# Patient Record
Sex: MALE | Race: BLACK OR AFRICAN AMERICAN | NOT HISPANIC OR LATINO | Employment: OTHER | URBAN - METROPOLITAN AREA
[De-identification: names, ages, dates, MRNs, and addresses within clinical notes are randomized per-mention and may not be internally consistent; named-entity substitution may affect disease eponyms.]

---

## 2017-07-13 PROBLEM — D64.9 SYMPTOMATIC ANEMIA: Status: ACTIVE | Noted: 2017-07-13

## 2017-07-14 PROBLEM — K21.9 GERD (GASTROESOPHAGEAL REFLUX DISEASE): Status: ACTIVE | Noted: 2017-07-14

## 2017-07-14 PROBLEM — I25.10 CAD (CORONARY ARTERY DISEASE): Status: ACTIVE | Noted: 2017-07-14

## 2017-07-14 PROBLEM — E78.5 HLD (HYPERLIPIDEMIA): Status: ACTIVE | Noted: 2017-07-14

## 2018-09-29 ENCOUNTER — HOSPITAL ENCOUNTER (EMERGENCY)
Facility: HOSPITAL | Age: 73
Discharge: HOME | End: 2018-09-29
Attending: EMERGENCY MEDICINE
Payer: MEDICARE

## 2018-09-29 VITALS
BODY MASS INDEX: 22.53 KG/M2 | OXYGEN SATURATION: 95 % | HEIGHT: 73 IN | WEIGHT: 170 LBS | SYSTOLIC BLOOD PRESSURE: 127 MMHG | TEMPERATURE: 99 F | DIASTOLIC BLOOD PRESSURE: 67 MMHG | HEART RATE: 72 BPM | RESPIRATION RATE: 16 BRPM

## 2018-09-29 DIAGNOSIS — R79.1 SUPRATHERAPEUTIC INR: Primary | ICD-10-CM

## 2018-09-29 LAB
ALBUMIN SERPL BCP-MCNC: 3.7 G/DL
ALP SERPL-CCNC: 49 U/L
ALT SERPL W/O P-5'-P-CCNC: 14 U/L
ANION GAP SERPL CALC-SCNC: 10 MMOL/L
APTT BLDCRRT: 38.3 SEC
AST SERPL-CCNC: 19 U/L
BASOPHILS # BLD AUTO: 0.02 K/UL
BASOPHILS NFR BLD: 0.5 %
BILIRUB SERPL-MCNC: 0.2 MG/DL
BUN SERPL-MCNC: 19 MG/DL
CALCIUM SERPL-MCNC: 9.4 MG/DL
CHLORIDE SERPL-SCNC: 107 MMOL/L
CO2 SERPL-SCNC: 23 MMOL/L
CREAT SERPL-MCNC: 1.2 MG/DL
DIFFERENTIAL METHOD: ABNORMAL
EOSINOPHIL # BLD AUTO: 0.2 K/UL
EOSINOPHIL NFR BLD: 4.8 %
ERYTHROCYTE [DISTWIDTH] IN BLOOD BY AUTOMATED COUNT: 14.8 %
EST. GFR  (AFRICAN AMERICAN): >60 ML/MIN/1.73 M^2
EST. GFR  (NON AFRICAN AMERICAN): 59.6 ML/MIN/1.73 M^2
GLUCOSE SERPL-MCNC: 102 MG/DL
HCT VFR BLD AUTO: 39.9 %
HGB BLD-MCNC: 12.3 G/DL
IMM GRANULOCYTES # BLD AUTO: 0.01 K/UL
IMM GRANULOCYTES NFR BLD AUTO: 0.3 %
INR PPP: 4.3
LYMPHOCYTES # BLD AUTO: 0.7 K/UL
LYMPHOCYTES NFR BLD: 18.8 %
MCH RBC QN AUTO: 29.1 PG
MCHC RBC AUTO-ENTMCNC: 30.8 G/DL
MCV RBC AUTO: 95 FL
MONOCYTES # BLD AUTO: 0.3 K/UL
MONOCYTES NFR BLD: 6.4 %
NEUTROPHILS # BLD AUTO: 2.7 K/UL
NEUTROPHILS NFR BLD: 69.2 %
NRBC BLD-RTO: 0 /100 WBC
PLATELET # BLD AUTO: 261 K/UL
PMV BLD AUTO: 10.2 FL
POTASSIUM SERPL-SCNC: 3.7 MMOL/L
PROT SERPL-MCNC: 7.6 G/DL
PROTHROMBIN TIME: 42 SEC
RBC # BLD AUTO: 4.22 M/UL
SODIUM SERPL-SCNC: 140 MMOL/L
WBC # BLD AUTO: 3.93 K/UL

## 2018-09-29 PROCEDURE — 85025 COMPLETE CBC W/AUTO DIFF WBC: CPT

## 2018-09-29 PROCEDURE — 85730 THROMBOPLASTIN TIME PARTIAL: CPT

## 2018-09-29 PROCEDURE — 99283 EMERGENCY DEPT VISIT LOW MDM: CPT

## 2018-09-29 PROCEDURE — 99283 EMERGENCY DEPT VISIT LOW MDM: CPT | Mod: ,,, | Performed by: PHYSICIAN ASSISTANT

## 2018-09-29 PROCEDURE — 80053 COMPREHEN METABOLIC PANEL: CPT

## 2018-09-29 PROCEDURE — 85610 PROTHROMBIN TIME: CPT

## 2018-09-29 NOTE — ED TRIAGE NOTES
States that his doctor called him Friday and told him he has a blockage to his jugular vein (side unknown).  He presents to ER to find out what he is supposed to do.  States that he is having pain in his right neck and down his right arm.  Patient's name and date of birth checked and is correct.    LOC: The patient is awake, alert, and oriented to place, time, situation. Affect is appropriate.  Speech is appropriate and clear.      APPEARANCE: Patient resting comfortably, reporting palpation, light headedness,  in no acute distress.  Patient is clean and well groomed.     SKIN: The skin is warm and dry; color consistent with ethnicity.  Patient has normal skin turgor and moist mucus membranes.  Skin intact; no breakdown or bruising noted.      MUSCULOSKELETAL: Patient moving upper extremities without difficulty.  Has weakness in his lower extremities and an unsteady gait.      RESPIRATORY: Airway is open and patent. Respirations spontaneous, even, easy, and non-labored.  Patient has a normal effort and rate.  No accessory muscle use noted. Denies cough.  BS clear.     CARDIAC:  No peripheral edema noted. No complaints of chest pain.       ABDOMEN: Soft and non tender to palpation.  No distention noted.      NEUROLOGIC: Eyes open spontaneously.  Behavior appropriate to situation.  Follows commands; facial expression symmetrical.  Purposeful motor response noted; normal sensation in all extremities.

## 2018-09-29 NOTE — ED PROVIDER NOTES
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 Patient states that he had an appointment with his heart doctor in [place] yesterday and ultrasounds were done at the time.  He was told that he has a clot into his vein.  He is not sure what side.  He states that he was told that records will be sent to his family doctor for follow-up but nothing was sent.  He states that he was not sure what the treatment plan was therefore he came to the ED.  He states that he has had some right neck discomfort for many months now.  No associated swelling or redness to the site.  He denies associated shortness of breath or chest pain. Patient states he has been compliant with his Coumadin.  Denies any injury trauma or fall.      The history is provided by the patient.     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     Past Surgical History:   Procedure Laterality Date    BACK SURGERY  1982     CHOLECYSTECTOMY      ERCP N/A 9/9/2014    [facility]formed by [facility] (2ND FLR)    ESOPHAGOGASTRODUODENOSCOPY (EGD) N/A 8/19/2015    [facility] by [facility] END[facility] (2ND FLR)    LUNG LOBECTOMY Left 2001    Left lower lobe    ULTRASOUND-ENDOSCOPIC-UPPER N/A 2/24/2015    [facility] by [facility] END[facility] (2ND FLR)    ULTRASOUND-ENDOSCOPIC-UPPER N/A 8/6/2014    Performed by [facility] at [facility] ENDO (2ND FLR)     Family History   Problem Relation Age of Onset    Hypertension Father     Diabetes Sister      Social History     Tobacco Use    Smoking status: Never Smoker    Smokeless tobacco: Never Used   Substance Use Topics    Alcohol use: No    Drug use: No     Review of Systems   Constitutional: Negative for chills and fever.   HENT: Negative for congestion.    Respiratory: Negative for cough and shortness of breath.    Cardiovascular: Negative for chest pain and palpitations.   Gastrointestinal: Negative for nausea and vomiting.   Musculoskeletal: Positive for neck pain.   Skin: Negative for rash and wound.   Allergic/Immunologic: Negative for immunocompromised state.   Neurological: Negative for dizziness and weakness.   Hematological: Does not bruise/bleed easily.   Psychiatric/Behavioral: Negative for confusion.       Physical Exam     Initial Vitals [09/29/18 1553]   BP Pulse Resp Temp SpO2   122/67 88 18 98.7 °F (37.1 °C) 99 %      MAP       --         Physical Exam    Constitutional: He appears well-developed and well-nourished. He is not diaphoretic. No distress.   HENT:   Head: Normocephalic and atraumatic.   Mouth/Throat: Oropharynx is clear and moist.   Eyes: Conjunctivae and EOM are normal.   Neck: Normal range of motion and full passive range of motion without pain. Neck supple. No spinous process tenderness and no muscular tenderness present. No edema and no erythema present.   Cardiovascular: Normal rate, regular rhythm, normal heart sounds  and intact distal pulses.   Pulmonary/Chest: Breath sounds normal.   Neurological: He is alert and oriented to person, place, and time.   Skin: Skin is warm and dry.         ED Course   Procedures  Labs Reviewed   CBC W/ AUTO DIFFERENTIAL - Abnormal; Notable for the following components:       Result Value    RBC 4.22 (*)     Hemoglobin 12.3 (*)     Hematocrit 39.9 (*)     MCHC 30.8 (*)     RDW 14.8 (*)     Lymph # 0.7 (*)     All other components within normal limits   COMPREHENSIVE METABOLIC PANEL - Abnormal; Notable for the following components:    Alkaline Phosphatase 49 (*)     eGFR if non  59.6 (*)     All other components within normal limits   PROTIME-INR - Abnormal; Notable for the following components:    Prothrombin Time 42.0 (*)     INR 4.3 (*)     All other components within normal limits   APTT - Abnormal; Notable for the following components:    aPTT 38.3 (*)     All other components within normal limits          Imaging Results    None                APC / Resident Notes:   Patient was seen in the ER promptly upon arrival.  He is afebrile, no acute distress.  Lung auscultation clear.  No swelling to the upper extremity bilaterally.     Laboratory studies show normal white count of 3.9.  Hemoglobin stable.  Chemistries were unremarkable. Patient's INR is supratherapeutic at 4.3.     Do not feel that he requires another ultrasound at this time as he is super therapeutic in his INR.  I did advise both patient and daughter to obtain imaging report from the cardiovascular [place] that conducted the ultrasound.  Advised to follow up with Heme-Onc as well as family doctor on Monday regarding further management of this possible clot.  He was also advised to follow up with his family doctor regarding management of his Coumadin level.    Vitals were stable on discharge. He was given strict return precautions to the ED.  Was agreeable to this treatment plan. Stable for discharge. The care of  this patient was overseen by attending physician who agrees with treatment, plan, and disposition.           Attending Attestation:     Physician Attestation Statement for NP/PA:   I have conducted a face to face encounter with this patient in addition to the NP/PA, due to Medical Complexity    Other NP/PA Attestation Additions:      Medical Decision Making: Had previous subclav dvt that needed stenting after having a pic line. Sounds like  A partial dvt of jugular now. Will check inr and blood counts. No acute indix to repeat image based on absence of physical findings.                     Clinical Impression:   The encounter diagnosis was Supratherapeutic INR.      Disposition:   Disposition: Discharged  Condition: Stable                        Ebony Serrato PA-C  09/29/18 2555

## 2018-09-29 NOTE — DISCHARGE INSTRUCTIONS
Follow-up with your family doctor or hematologist on Monday morning.  Obtain records of ultrasound from [place].  Your current INR is 4.3, follow up with your doctor regarding this

## 2018-09-29 NOTE — ED NOTES
Patient is a very poor historian in regards to his medication, therefore, an accurate medication review is not possible at this time.

## 2018-10-11 ENCOUNTER — TELEPHONE (OUTPATIENT)
Dept: ADMINISTRATIVE | Facility: OTHER | Age: 73
End: 2018-10-11

## 2018-10-11 NOTE — TELEPHONE ENCOUNTER
----- Message from Kesha Whelan sent at 10/11/2018  5:21 PM CDT -----  Contact: Pt yahir Villegas can be reached at [phone]  Pt has blood clot located on right side of his neck.  Pt is requesting an appt soon as possible.      Thank you!

## 2018-10-12 NOTE — TELEPHONE ENCOUNTER
Left message for the pt to call the clinic, no ans on the [phone] . Need more information to schedule the pt

## 2018-10-15 PROBLEM — D64.9 ANEMIA: Status: ACTIVE | Noted: 2018-10-15

## 2018-10-17 PROBLEM — R79.1 SUPRATHERAPEUTIC INR: Status: ACTIVE | Noted: 2018-10-17

## 2018-10-17 PROBLEM — I95.9 HYPOTENSION: Status: ACTIVE | Noted: 2018-10-17

## 2018-11-14 ENCOUNTER — OFFICE VISIT (OUTPATIENT)
Dept: GASTROENTEROLOGY | Facility: CLINIC | Age: 73
End: 2018-11-14
Payer: MEDICARE

## 2018-11-14 ENCOUNTER — TELEPHONE (OUTPATIENT)
Dept: GASTROENTEROLOGY | Facility: CLINIC | Age: 73
End: 2018-11-14

## 2018-11-14 ENCOUNTER — OFFICE VISIT (OUTPATIENT)
Dept: SURGERY | Facility: CLINIC | Age: 73
End: 2018-11-14
Payer: MEDICARE

## 2018-11-14 VITALS
DIASTOLIC BLOOD PRESSURE: 78 MMHG | BODY MASS INDEX: 23.99 KG/M2 | HEART RATE: 78 BPM | WEIGHT: 181 LBS | HEIGHT: 73 IN | SYSTOLIC BLOOD PRESSURE: 147 MMHG

## 2018-11-14 VITALS
WEIGHT: 180.81 LBS | SYSTOLIC BLOOD PRESSURE: 140 MMHG | HEART RATE: 76 BPM | HEIGHT: 73 IN | DIASTOLIC BLOOD PRESSURE: 75 MMHG | BODY MASS INDEX: 23.96 KG/M2

## 2018-11-14 DIAGNOSIS — K92.2 GASTROINTESTINAL HEMORRHAGE, UNSPECIFIED GASTROINTESTINAL HEMORRHAGE TYPE: Primary | ICD-10-CM

## 2018-11-14 DIAGNOSIS — D50.9 IRON DEFICIENCY ANEMIA, UNSPECIFIED IRON DEFICIENCY ANEMIA TYPE: ICD-10-CM

## 2018-11-14 DIAGNOSIS — K29.70 GASTRITIS, PRESENCE OF BLEEDING UNSPECIFIED, UNSPECIFIED CHRONICITY, UNSPECIFIED GASTRITIS TYPE: Primary | ICD-10-CM

## 2018-11-14 PROCEDURE — 99213 OFFICE O/P EST LOW 20 MIN: CPT | Mod: PBBFAC,27 | Performed by: INTERNAL MEDICINE

## 2018-11-14 PROCEDURE — 99999 PR PBB SHADOW E&M-EST. PATIENT-LVL III: CPT | Mod: PBBFAC,,, | Performed by: INTERNAL MEDICINE

## 2018-11-14 PROCEDURE — 99499 UNLISTED E&M SERVICE: CPT | Mod: S$PBB,,, | Performed by: COLON & RECTAL SURGERY

## 2018-11-14 PROCEDURE — 99999 PR PBB SHADOW E&M-EST. PATIENT-LVL III: CPT | Mod: PBBFAC,,, | Performed by: COLON & RECTAL SURGERY

## 2018-11-14 PROCEDURE — 99213 OFFICE O/P EST LOW 20 MIN: CPT | Mod: PBBFAC | Performed by: COLON & RECTAL SURGERY

## 2018-11-14 PROCEDURE — 99204 OFFICE O/P NEW MOD 45 MIN: CPT | Mod: S$PBB,,, | Performed by: INTERNAL MEDICINE

## 2018-11-14 NOTE — H&P (VIEW-ONLY)
REASON FOR VISIT:  Melena.    HISTORY OF PRESENT ILLNESS:  Mr. Lima was previously seen in [facility] for chronic anemia secondary to suspected obscure GI bleed.  He   is on Coumadin for DVT and had melena with progressive anemia.  He recently got   transfused 2 units of packed red blood cells and had to be re-transfused   another 2 units.  Recent EGD and colonoscopy were fine and a video capsule   endoscopy that was done by Dr. 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  He continues to complain of black   stools, although he is not on oral iron supplementation.  Denies any abdominal   pain, nausea or vomiting.  No hematemesis, maroon stool or bright red blood per   rectum.  He takes a baby aspirin every day and he is also on Coumadin.    PAST MEDICAL, SURGICAL, SOCIAL AND FAMILY HISTORY:  Reviewed.    MEDICATIONS AND ALLERGIES:  Reviewed.    REVIEW OF SYSTEMS:  CONSTITUTIONAL:  No fever, no chills, no weight loss.  Appetite is normal.  EYES:  No visual change.  ENT:  No odynophagia or hoarseness of voice.  CARDIOVASCULAR:  No angina or palpitations.  RESPIRATORY:  No shortness of breath or wheezing.  GASTROINTESTINAL:  See HPI.    PHYSICAL EXAMINATION:  VITAL SIGNS:  See EPIC.  GENERAL:  Awake, alert and oriented x3, in no acute distress.  NECK:  Supple, no carotid bruit.  No cervical adenopathy.  ABDOMEN:  Flat, soft, nontender, nondistended.  No masses palpable.  No   hepatosplenomegaly appreciated.  Bowel sounds are normal.  EYES:  Conjunctivae anicteric.  ENT:  Oropharynx, mucosa moist.  Mallampati 1.  CARDIOVASCULAR:  S1, S2 normal.  RESPIRATORY:  Bilateral air entry equal.  SKIN:  No palmar erythema or spider angiomata.  NEUROLOGIC:  No asterixis.  PSYCHIATRIC:  Affect appropriate.  LOWER EXTREMITY:  No pedal edema.    IMPRESSION:  Obscure GI bleed, on anticoagulation with recent melena.  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    RECOMMENDATION:  1.  Proceed with anterograde balloon-assisted enteroscopy.  2.  Check CBC today.  Check occult blood in the stool.  Further recommendation   based on above.      ACT/HN  dd: 11/14/2018 14:18:10 (CST)  td: 11/15/2018 06:04:34 (CST)  Doc ID   #[id number]  Job ID #[id number]    CC:

## 2018-11-14 NOTE — TELEPHONE ENCOUNTER
----- Message from Thang Gardner MD sent at 11/14/2018  3:00 PM CST -----  Please notify patient, the blood count is stable.

## 2018-11-14 NOTE — TELEPHONE ENCOUNTER
Dr. Jj Metz seen this patient today and he states he need a ballon   Please advise pt on schedule  thanks

## 2018-11-14 NOTE — PROGRESS NOTES
REASON FOR VISIT:  Melena.    HISTORY OF PRESENT ILLNESS:  Mr. Lima was previously seen in [facility] for chronic anemia secondary to suspected obscure GI bleed.  He   is on Coumadin for DVT and had melena with progressive anemia.  He recently got   transfused 2 units of packed red blood cells and had to be re-transfused   another 2 units.  Recent EGD and colonoscopy were fine and a video capsule   endoscopy that was done by Dr. 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  He continues to complain of black   stools, although he is not on oral iron supplementation.  Denies any abdominal   pain, nausea or vomiting.  No hematemesis, maroon stool or bright red blood per   rectum.  He takes a baby aspirin every day and he is also on Coumadin.    PAST MEDICAL, SURGICAL, SOCIAL AND FAMILY HISTORY:  Reviewed.    MEDICATIONS AND ALLERGIES:  Reviewed.    REVIEW OF SYSTEMS:  CONSTITUTIONAL:  No fever, no chills, no weight loss.  Appetite is normal.  EYES:  No visual change.  ENT:  No odynophagia or hoarseness of voice.  CARDIOVASCULAR:  No angina or palpitations.  RESPIRATORY:  No shortness of breath or wheezing.  GASTROINTESTINAL:  See HPI.    PHYSICAL EXAMINATION:  VITAL SIGNS:  See EPIC.  GENERAL:  Awake, alert and oriented x3, in no acute distress.  NECK:  Supple, no carotid bruit.  No cervical adenopathy.  ABDOMEN:  Flat, soft, nontender, nondistended.  No masses palpable.  No   hepatosplenomegaly appreciated.  Bowel sounds are normal.  EYES:  Conjunctivae anicteric.  ENT:  Oropharynx, mucosa moist.  Mallampati 1.  CARDIOVASCULAR:  S1, S2 normal.  RESPIRATORY:  Bilateral air entry equal.  SKIN:  No palmar erythema or spider angiomata.  NEUROLOGIC:  No asterixis.  PSYCHIATRIC:  Affect appropriate.  LOWER EXTREMITY:  No pedal edema.    IMPRESSION:  Obscure GI bleed, on anticoagulation with recent melena.  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    RECOMMENDATION:  1.  Proceed with anterograde balloon-assisted enteroscopy.  2.  Check CBC today.  Check occult blood in the stool.  Further recommendation   based on above.      ACT/HN  dd: 11/14/2018 14:18:10 (CST)  td: 11/15/2018 06:04:34 (CST)  Doc ID   #[id number]  Job ID #[id number]    CC:

## 2018-11-15 ENCOUNTER — LAB VISIT (OUTPATIENT)
Dept: LAB | Facility: HOSPITAL | Age: 73
End: 2018-11-15
Attending: INTERNAL MEDICINE
Payer: MEDICARE

## 2018-11-15 ENCOUNTER — OFFICE VISIT (OUTPATIENT)
Dept: OPTOMETRY | Facility: CLINIC | Age: 73
End: 2018-11-15
Payer: MEDICARE

## 2018-11-15 DIAGNOSIS — Z98.41 S/P BILATERAL CATARACT EXTRACTION: ICD-10-CM

## 2018-11-15 DIAGNOSIS — H52.7 REFRACTIVE ERRORS: ICD-10-CM

## 2018-11-15 DIAGNOSIS — H31.011 MACULAR SCAR OF RIGHT EYE: Primary | ICD-10-CM

## 2018-11-15 DIAGNOSIS — Z96.1 PSEUDOPHAKIA OF BOTH EYES: ICD-10-CM

## 2018-11-15 DIAGNOSIS — K92.2 GASTROINTESTINAL HEMORRHAGE, UNSPECIFIED GASTROINTESTINAL HEMORRHAGE TYPE: ICD-10-CM

## 2018-11-15 DIAGNOSIS — Z98.42 S/P BILATERAL CATARACT EXTRACTION: ICD-10-CM

## 2018-11-15 LAB — OB PNL STL: POSITIVE

## 2018-11-15 PROCEDURE — 99213 OFFICE O/P EST LOW 20 MIN: CPT | Mod: PBBFAC | Performed by: OPTOMETRIST

## 2018-11-15 PROCEDURE — 99999 PR PBB SHADOW E&M-EST. PATIENT-LVL III: CPT | Mod: PBBFAC,,, | Performed by: OPTOMETRIST

## 2018-11-15 PROCEDURE — 92004 COMPRE OPH EXAM NEW PT 1/>: CPT | Mod: S$PBB,,, | Performed by: OPTOMETRIST

## 2018-11-15 PROCEDURE — 92015 DETERMINE REFRACTIVE STATE: CPT | Mod: ,,, | Performed by: OPTOMETRIST

## 2018-11-15 PROCEDURE — 82272 OCCULT BLD FECES 1-3 TESTS: CPT

## 2018-11-15 NOTE — PATIENT INSTRUCTIONS
S/P cataract surgery in both eyes, with bilateral posterior chamber intraocular lens.  S/P YAG laser posterior capsulotomy in both eyes.  Chorioretinitis vs laser scarring extending into macular region in the right eye.  Poor correctable VA in the right eye secondarily.  Residual refractive error in each eye.   New spectacle lens Rx issued.  Recommend full-time wear.  Recheck in six months - will send recall

## 2018-11-15 NOTE — PROGRESS NOTES
"HPI     Concerns About Ocular Health      Additional comments: General eye examination and refraction  S/P cataract surgery OU (in [place], [place]).  "everything is blurry" - states "blind spot in the right eye" - reports   s/p laser procedure done in [place] "to stop leakage".  Not diabetic.               Comments     Patient's age: 73 y.o. AA Male  Occupation: retired  Approximate date of last eye examination:  March 2017  Name of last eye doctor seen:   Dr. Allen   City/State: [place], [place]  Wears glasses? No -has no glasses     If yes, wears  Full-time or part-time?  n/a  Present glasses are: Bifocal, SV Distance, SV Reading?  n/a  Approximate age of present glasses:  n/a   Got new glasses following last exam, or subsequently?:  n/a   Any problem with VA with glasses?  n/a  Wears CLs?:  no   Headaches?  Yes sometimes  Eye pain/discomfort?  Blurry eyes  ; have a blind spot in the right eye -   see notes above                                                                             Flashes?  no  Floaters?  no  Diplopia/Double vision?  no  Patient's Ocular History:         Any eye surgeries? Cataract sx OU         Any eye injury?  no         Any treatment for eye disease?  no  Family history of eye disease?  no  Significant patient medical history:         1. Diabetes?  no       If yes, IDDM or NIDDM? no   2. HBP?  Yes controlled                ! OTC eyedrops currently using:  no   ! Prescription eye meds currently using:  no   ! Any history of allergy/adverse reaction to any eye meds used   previously?  no    ! Any history of allergy/adverse reaction to eyedrops used during prior   eye exam(s)? no    ! Any history of allergy/adverse reaction to Novacaine or similar meds?   no   ! Any history of allergy/adverse reaction to Epinephrine or similar meds?   no    ! Patient okay with use of anesthetic eyedrops to check eye pressure?    yes        ! Patient okay with use of eyedrops to dilate pupils today?  yes   !  " Allergies/Medications/Medical History/Family History reviewed today?    yes      PD =     Desired reading distance =                                                                       Last edited by Mil Sanchez, OD on 11/15/2018  2:21 PM. (History)            Assessment /Plan     For exam results, see Encounter Report.    1. Macular scar of right eye     2. S/P bilateral cataract extraction     3. Pseudophakia of both eyes     4. Refractive errors                 S/P cataract surgery in both eyes, with bilateral posterior chamber intraocular lens.  S/P YAG laser posterior capsulotomy in both eyes.  Chorioretinitis vs laser scarring extending into macular region in the right eye.  Poor correctable VA in the right eye secondarily.  Residual refractive error in each eye.   New spectacle lens Rx issued.  Recommend full-time wear.  Recheck in six months - will send recall

## 2018-11-16 ENCOUNTER — TELEPHONE (OUTPATIENT)
Dept: ENDOSCOPY | Facility: HOSPITAL | Age: 73
End: 2018-11-16

## 2018-11-16 ENCOUNTER — TELEPHONE (OUTPATIENT)
Dept: GASTROENTEROLOGY | Facility: CLINIC | Age: 73
End: 2018-11-16

## 2018-11-16 NOTE — TELEPHONE ENCOUNTER
----- Message from Thang Gardner MD sent at 11/16/2018  7:30 AM CST -----  Stool test reveals blood.

## 2018-11-16 NOTE — TELEPHONE ENCOUNTER
Ma spoke with pt ,pt advise that someone form Dr. Johnson office will call him to scheduled his double balloon   Pt verbalized understanding

## 2018-11-16 NOTE — TELEPHONE ENCOUNTER
----- Message from Christy Ochoa sent at 11/16/2018  2:14 PM CST -----  Contact: Self- [phone]  Jj- pt called to get clarity on stool sample results- states he didn't understand fully- please contact pt at [phone]

## 2018-11-16 NOTE — TELEPHONE ENCOUNTER
----- Message from Monica Dubose sent at 11/16/2018  1:34 PM CST -----  Contact: self   Saw dr slater for blood in stool and he told patient to see dr saenz or dr nobles - please call patient at

## 2018-11-16 NOTE — TELEPHONE ENCOUNTER
Spoke with patient. Balloon procedure procedure scheduled for 11/29 at 10:30 pending ok to hold Coumadin. Reviewed prep instructions. Mr Lima verbalized understanding.

## 2018-11-19 ENCOUNTER — TELEPHONE (OUTPATIENT)
Dept: ENDOSCOPY | Facility: HOSPITAL | Age: 73
End: 2018-11-19

## 2018-11-19 DIAGNOSIS — Z79.01 ANTICOAGULATED ON COUMADIN: Primary | ICD-10-CM

## 2018-11-19 NOTE — TELEPHONE ENCOUNTER
Per request of Dr Beny Meredith (who gave permission to hold Coumadin for 3 days prior to Device Assisted EGD scheduled 11/29/18), Dr Cheng Garza (patient's PCP) notified by phone call of procedure and Coumadin hold.

## 2018-11-19 NOTE — TELEPHONE ENCOUNTER
Received fax from Dr Beny Meredith giving permission to hold Coumadin for 3 days prior to Device Assisted EGD scheduled 11/29/18 at 1030.  Scanned to media tab.  Spoke with patient and daughter, Edilma, about instructions for procedure.  Instructions emailed to Edilma and mailed to patient.  PT/INR scheduled before procedure (3 day hold Coumadin).

## 2018-11-19 NOTE — TELEPHONE ENCOUNTER
Spoke to Dede SMITH with Dr Randy Ward and Dr Beny Moore/[facility].  Faxed her (ph [phone]/fx [phone]) requesting permission to hold Coumadin for 5 days prior to Device Assisted EGD scheduled 11/29/18.

## 2018-11-26 ENCOUNTER — TELEPHONE (OUTPATIENT)
Dept: ENDOSCOPY | Facility: HOSPITAL | Age: 73
End: 2018-11-26

## 2018-11-26 NOTE — TELEPHONE ENCOUNTER
Message   Received: Today   Message Contents   MD Shaila Chapman MA   Caller: Unspecified (1 week ago)             Okay to schedule for balloon.

## 2018-11-29 ENCOUNTER — ANESTHESIA (OUTPATIENT)
Dept: ENDOSCOPY | Facility: HOSPITAL | Age: 73
End: 2018-11-29
Payer: MEDICARE

## 2018-11-29 ENCOUNTER — HOSPITAL ENCOUNTER (OUTPATIENT)
Facility: HOSPITAL | Age: 73
Discharge: HOME | End: 2018-11-29
Attending: INTERNAL MEDICINE | Admitting: INTERNAL MEDICINE
Payer: MEDICARE

## 2018-11-29 ENCOUNTER — ANESTHESIA EVENT (OUTPATIENT)
Dept: ENDOSCOPY | Facility: HOSPITAL | Age: 73
End: 2018-11-29
Payer: MEDICARE

## 2018-11-29 VITALS
RESPIRATION RATE: 18 BRPM | HEIGHT: 73 IN | DIASTOLIC BLOOD PRESSURE: 67 MMHG | BODY MASS INDEX: 22.53 KG/M2 | WEIGHT: 170 LBS | SYSTOLIC BLOOD PRESSURE: 133 MMHG | TEMPERATURE: 98 F | HEART RATE: 60 BPM | OXYGEN SATURATION: 95 %

## 2018-11-29 DIAGNOSIS — K92.1 MELENA: Primary | ICD-10-CM

## 2018-11-29 PROCEDURE — D9220A PRA ANESTHESIA: Mod: CRNA,,, | Performed by: NURSE ANESTHETIST, CERTIFIED REGISTERED

## 2018-11-29 PROCEDURE — 63600175 PHARM REV CODE 636 W HCPCS: Performed by: NURSE ANESTHETIST, CERTIFIED REGISTERED

## 2018-11-29 PROCEDURE — 37000008 HC ANESTHESIA 1ST 15 MINUTES: Performed by: INTERNAL MEDICINE

## 2018-11-29 PROCEDURE — D9220A PRA ANESTHESIA: Mod: ANES,,, | Performed by: ANESTHESIOLOGY

## 2018-11-29 PROCEDURE — 25000003 PHARM REV CODE 250: Performed by: NURSE ANESTHETIST, CERTIFIED REGISTERED

## 2018-11-29 PROCEDURE — 44799 UNLISTED PX SMALL INTESTINE: CPT | Mod: ,,, | Performed by: INTERNAL MEDICINE

## 2018-11-29 PROCEDURE — 44376 SMALL BOWEL ENDOSCOPY: CPT | Mod: ,,, | Performed by: INTERNAL MEDICINE

## 2018-11-29 PROCEDURE — 44366 SMALL BOWEL ENDOSCOPY: CPT | Performed by: INTERNAL MEDICINE

## 2018-11-29 PROCEDURE — 44799 UNLISTED PX SMALL INTESTINE: CPT | Performed by: INTERNAL MEDICINE

## 2018-11-29 PROCEDURE — 25000003 PHARM REV CODE 250: Performed by: INTERNAL MEDICINE

## 2018-11-29 PROCEDURE — 37000009 HC ANESTHESIA EA ADD 15 MINS: Performed by: INTERNAL MEDICINE

## 2018-11-29 RX ORDER — SODIUM CHLORIDE 0.9 % (FLUSH) 0.9 %
3 SYRINGE (ML) INJECTION
Status: DISCONTINUED | OUTPATIENT
Start: 2018-11-29 | End: 2018-11-29 | Stop reason: HOSPADM

## 2018-11-29 RX ORDER — SUCCINYLCHOLINE CHLORIDE 20 MG/ML
INJECTION INTRAMUSCULAR; INTRAVENOUS
Status: DISCONTINUED | OUTPATIENT
Start: 2018-11-29 | End: 2018-11-29

## 2018-11-29 RX ORDER — EPHEDRINE SULFATE 50 MG/ML
INJECTION, SOLUTION INTRAVENOUS
Status: DISCONTINUED | OUTPATIENT
Start: 2018-11-29 | End: 2018-11-29

## 2018-11-29 RX ORDER — VASOPRESSIN 20 [USP'U]/ML
INJECTION, SOLUTION INTRAMUSCULAR; SUBCUTANEOUS
Status: DISCONTINUED | OUTPATIENT
Start: 2018-11-29 | End: 2018-11-29

## 2018-11-29 RX ORDER — SODIUM CHLORIDE 9 MG/ML
INJECTION, SOLUTION INTRAVENOUS CONTINUOUS
Status: DISCONTINUED | OUTPATIENT
Start: 2018-11-29 | End: 2018-11-29 | Stop reason: HOSPADM

## 2018-11-29 RX ORDER — PROPOFOL 10 MG/ML
VIAL (ML) INTRAVENOUS
Status: DISCONTINUED | OUTPATIENT
Start: 2018-11-29 | End: 2018-11-29

## 2018-11-29 RX ORDER — FENTANYL CITRATE 50 UG/ML
INJECTION, SOLUTION INTRAMUSCULAR; INTRAVENOUS
Status: DISCONTINUED | OUTPATIENT
Start: 2018-11-29 | End: 2018-11-29

## 2018-11-29 RX ORDER — MIDAZOLAM HYDROCHLORIDE 1 MG/ML
INJECTION, SOLUTION INTRAMUSCULAR; INTRAVENOUS
Status: DISCONTINUED | OUTPATIENT
Start: 2018-11-29 | End: 2018-11-29

## 2018-11-29 RX ORDER — ONDANSETRON 2 MG/ML
INJECTION INTRAMUSCULAR; INTRAVENOUS
Status: DISCONTINUED | OUTPATIENT
Start: 2018-11-29 | End: 2018-11-29

## 2018-11-29 RX ORDER — PHENYLEPHRINE HYDROCHLORIDE 10 MG/ML
INJECTION INTRAVENOUS
Status: DISCONTINUED | OUTPATIENT
Start: 2018-11-29 | End: 2018-11-29

## 2018-11-29 RX ORDER — ROCURONIUM BROMIDE 10 MG/ML
INJECTION, SOLUTION INTRAVENOUS
Status: DISCONTINUED | OUTPATIENT
Start: 2018-11-29 | End: 2018-11-29

## 2018-11-29 RX ORDER — LIDOCAINE HCL/PF 100 MG/5ML
SYRINGE (ML) INTRAVENOUS
Status: DISCONTINUED | OUTPATIENT
Start: 2018-11-29 | End: 2018-11-29

## 2018-11-29 RX ADMIN — ROCURONIUM BROMIDE 10 MG: 10 INJECTION, SOLUTION INTRAVENOUS at 10:11

## 2018-11-29 RX ADMIN — SODIUM CHLORIDE: 0.9 INJECTION, SOLUTION INTRAVENOUS at 09:11

## 2018-11-29 RX ADMIN — FENTANYL CITRATE 100 MCG: 50 INJECTION, SOLUTION INTRAMUSCULAR; INTRAVENOUS at 10:11

## 2018-11-29 RX ADMIN — ONDANSETRON 4 MG: 2 INJECTION INTRAMUSCULAR; INTRAVENOUS at 10:11

## 2018-11-29 RX ADMIN — PHENYLEPHRINE HYDROCHLORIDE 100 MCG: 10 INJECTION INTRAVENOUS at 10:11

## 2018-11-29 RX ADMIN — PROPOFOL 150 MG: 10 INJECTION, EMULSION INTRAVENOUS at 10:11

## 2018-11-29 RX ADMIN — EPHEDRINE SULFATE 10 MG: 50 INJECTION, SOLUTION INTRAMUSCULAR; INTRAVENOUS; SUBCUTANEOUS at 10:11

## 2018-11-29 RX ADMIN — VASOPRESSIN 2 UNITS: 20 INJECTION INTRAVENOUS at 10:11

## 2018-11-29 RX ADMIN — VASOPRESSIN 3 UNITS: 20 INJECTION INTRAVENOUS at 10:11

## 2018-11-29 RX ADMIN — LIDOCAINE HYDROCHLORIDE 100 MG: 20 INJECTION, SOLUTION INTRAVENOUS at 10:11

## 2018-11-29 RX ADMIN — MIDAZOLAM HYDROCHLORIDE 2 MG: 1 INJECTION, SOLUTION INTRAMUSCULAR; INTRAVENOUS at 10:11

## 2018-11-29 RX ADMIN — SUCCINYLCHOLINE CHLORIDE 140 MG: 20 INJECTION, SOLUTION INTRAMUSCULAR; INTRAVENOUS at 10:11

## 2018-11-29 NOTE — ANESTHESIA POSTPROCEDURE EVALUATION
"Anesthesia Post Evaluation    Patient: Heladio Lima    Procedure(s) Performed: Procedure(s) (LRB):  ENTEROSCOPY, DOUBLE BALLOON, ANTEGRADE (N/A)    Final Anesthesia Type: general  Patient location during evaluation: PACU  Patient participation: Yes- Able to Participate  Level of consciousness: awake and alert  Post-procedure vital signs: reviewed and stable  Pain management: adequate  Airway patency: patent  PONV status at discharge: No PONV  Anesthetic complications: no      Cardiovascular status: hemodynamically stable  Respiratory status: room air, spontaneous ventilation and unassisted  Hydration status: euvolemic  Follow-up not needed.        Visit Vitals  /67   Pulse 60   Temp 36.7 °C (98 °F) (Skin)   Resp 18   Ht 6' 1" (1.854 m)   Wt 77.1 kg (170 lb)   SpO2 95%   BMI 22.43 kg/m²       Pain/Karen Score: Pain Assessment Performed: Yes (11/29/2018 11:45 AM)  Presence of Pain: denies (11/29/2018 11:45 AM)  Karen Score: 10 (11/29/2018 11:45 AM)        "

## 2018-11-29 NOTE — INTERVAL H&P NOTE
Pre-Procedure H and P Addendum    Patient seen and examined.  History and exam unchanged from prior history and physical.      Procedure: Antegrade DBE  Indication: Small bowel bleeding  ASA Class: per anesthesiology  Airway: normal  Neck Mobility: full range of motion  Mallampatti score: per anesthesia  History of anesthesia problems: no  Family history of anesthesia problems: no  Anesthesia Plan: General    Anesthesia/Surgery risks, benefits and alternative options discussed and understood by patient/family.          Active Hospital Problems    Diagnosis  POA    Melena [K92.1]  Yes      Resolved Hospital Problems   No resolved problems to display.

## 2018-11-29 NOTE — ANESTHESIA PREPROCEDURE EVALUATION
11/29/2018  Heladio Lima is a 73 y.o., male     Pre-operative evaluation for Procedure(s) (LRB):  ENTEROSCOPY, DOUBLE BALLOON, ANTEGRADE (N/A)    Heladio Lima is a 73 y.o. 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    Secondary malignant neoplasm of right lung    Secondary malignant neoplasm of trachea    Iron deficiency anemia due to chronic blood loss    Symptomatic anemia    CAD (coronary artery disease)    GERD (gastroesophageal reflux disease)    HLD (hyperlipidemia)    Anemia    Supratherapeutic INR    Hypotension       Review of patient's allergies indicates:  No Known Allergies     Current Facility-Administered Medications on File Prior to Encounter   Medication Dose Route Frequency Provider Last Rate Last Dose    ferric carboxymaltose (INJECTAFER) 750 mg in sodium chloride 0.9% 265 mL infusion  750 mg Intravenous Once Jose David Joaquin MD         Current Outpatient Medications on File Prior to Encounter   Medication Sig Dispense Refill    acetaminophen (TYLENOL) 325 MG tablet Take 2 tablets (650 mg total) by mouth every 6 (six) hours as needed.  0    carvedilol (COREG) 3.125 MG tablet Take 1.56 mg by mouth 2 (two) times daily.   5    enalapril (VASOTEC) 5 MG tablet Take 5 mg by mouth once daily.      gabapentin (NEURONTIN) 100 MG capsule Take 1 capsule (100 mg total) by mouth 3 (three) times daily. Start 1 at night for 3 days then twice a day for 3 days then 3 times a day 30 capsule 0    gatifloxacin  0.5 % Drop drops STARTING 2 DAYS BEFORE SURGERY USE 1 GTT TO THE SURGICAL EYE QID  3    ketorolac 0.4% (ACULAR) 0.4 % Drop   3    omeprazole (PRILOSEC) 40 MG capsule Take 1 capsule (40 mg total) by mouth once daily. 30 capsule 3    pravastatin (PRAVACHOL) 80 MG tablet Take 1 tablet by mouth daily  3    prednisoLONE acetate (PRED FORTE) 1 % DrpS INT 1 GTT IN THE LEFT EYE QID  3    rosuvastatin (CRESTOR) 40 MG Tab Take 10 mg by mouth every evening.      warfarin (COUMADIN) 5 MG tablet Take 5 mg by mouth once daily. Take 1 tablet(s) by mouth every day except 1 and 1/2 tablets on Tuesday, Thursday, and Saturday. Or as directed by [facility] doctor.      zolpidem (AMBIEN) 10 mg Tab Take 1 tablet by mouth daily  1       Past Surgical History:   Procedure Laterality Date    BACK SURGERY  1982    CHOLECYSTECTOMY      COLONOSCOPY N/A 10/17/2018    Procedure: COLONOSCOPY;  Surgeon: Srinivasan Martinez MD;  Location: [facility];  Service: Endoscopy;  Laterality: N/A;    COLONOSCOPY N/A 10/17/2018    Performed by [facility]ivasan Martinez MD at [facility]    EGD (ESOPHAGOGASTRODUODENOSCOPY) N/A 10/17/2018    Performed by Srinivasan Martinez MD at [facility]    ERCP N/A 9/9/2014    Performed by Asael Buckley [facility] at [facility] (2ND FLR)    ESOPHAGOGASTRODUODENOSCOPY N/A 10/17/2018    Procedure: EGD (ESOPHAGOGASTRODUODENOSCOPY);  Surgeon: Srinivasan Martinez MD;  Location: [facility];  Service: Endoscopy;  Laterality: N/A;    ESOPHAGOGASTRODUODENOSCOPY (EGD) N/A 8/19/2015    Performed by Asael Buckley MD at [facility] ENDO (2ND FLR)    INSERTION, PICC N/A 10/16/2018    Performed by [facility] Diagnostic Provider at [facility] OR    LUNG LOBECTOMY Left 2001    Left lower lobe    PERIPHERALLY INSERTED CENTRAL CATHETER INSERTION N/A 10/16/2018    Procedure: INSERTION, PICC;  Surgeon: Keisha Diagnostic Provider;  Location: [facility] OR;  Service: General;  Laterality: N/A;    ULTRASOUND-ENDOSCOPIC-UPPER N/A 2/24/2015    Performed by Asael Buckley MD  at [facility] ENDO (2ND FLR)    ULTRASOUND-ENDOSCOPIC-UPPER N/A 2014    Performed by Asael Buckley MD at [facility] ENDO (2ND FLR)       Social History     Socioeconomic History    Marital status:      Spouse name: Not on file    Number of children: Not on file    Years of education: Not on file    Highest education level: Not on file   Social Needs    Financial resource strain: Not on file    Food insecurity - worry: Not on file    Food insecurity - inability: Not on file    Transportation needs - medical: Not on file    Transportation needs - non-medical: Not on file   Occupational History    Not on file   Tobacco Use    Smoking status: Never Smoker    Smokeless tobacco: Never Used   Substance and Sexual Activity    Alcohol use: No    Drug use: No    Sexual activity: Yes     Partners: Female   Other Topics Concern    Not on file   Social History Narrative    Not on file         Vital Signs Range (Last 24H):         CBC: No results for input(s): WBC, RBC, HGB, HCT, PLT, MCV, MCH, MCHC in the last 72 hours.    CMP: No results for input(s): NA, K, CL, CO2, BUN, CREATININE, GLU, MG, PHOS, CALCIUM, ALBUMIN, PROT, ALKPHOS, ALT, AST, BILITOT in the last 72 hours.    INR  No results for input(s): PT, INR, PROTIME, APTT in the last 72 hours.        Diagnostic Studies:      EKD Echo:      .    Anesthesia Evaluation         Review of Systems      Physical Exam  General:  Well nourished    Airway/Jaw/Neck:  Airway Findings: Mouth Opening: Normal Tongue: Normal  General Airway Assessment: Adult  Mallampati: II  Improves to I with phonation.  TM Distance: Normal, at least 6 cm            Mental Status:  Mental Status Findings:  Cooperative, Alert and Oriented         Anesthesia Plan  Type of Anesthesia, risks & benefits discussed:  Anesthesia Type:  general  Patient's Preference:   Intra-op Monitoring Plan: standard ASA monitors  Intra-op Monitoring Plan Comments:   Post Op Pain Control Plan:  multimodal analgesia  Post Op Pain Control Plan Comments:   Induction:   IV  Beta Blocker:  Patient is not currently on a Beta-Blocker (No further documentation required).       Informed Consent: Patient understands risks and agrees with Anesthesia plan.  Questions answered.   ASA Score: 3     Day of Surgery Review of History & Physical:    H&P update referred to the surgeon.         Ready For Surgery From Anesthesia Perspective.

## 2018-11-29 NOTE — TRANSFER OF CARE
"Anesthesia Transfer of Care Note    Patient: Heladio Lima    Procedure(s) Performed: Procedure(s) (LRB):  ENTEROSCOPY, DOUBLE BALLOON, ANTEGRADE (N/A)    Patient location: [facility]    Anesthesia Type: general    Transport from OR: Transported from OR on 6-10 L/min O2 by face mask with adequate spontaneous ventilation    Post pain: adequate analgesia    Post assessment: no apparent anesthetic complications and tolerated procedure well    Post vital signs: stable    Level of consciousness: responds to stimulation    Nausea/Vomiting: no nausea/vomiting    Complications: none    Transfer of care protocol was followed      Last vitals:   Visit Vitals  BP (!) 147/77 (BP Location: Left arm, Patient Position: Lying)   Pulse 62   Temp 36.4 °C (97.5 °F) (Oral)   Resp 18   Ht 6' 1" (1.854 m)   Wt 77.1 kg (170 lb)   SpO2 99%   BMI 22.43 kg/m²     "

## 2018-11-29 NOTE — DISCHARGE INSTRUCTIONS
PATIENT INSTRUCTIONS  POST-ANESTHESIA    IMMEDIATELY FOLLOWING SURGERY:  Do not drive or operate machinery for the first twenty four hours after surgery.  Do not make any important decisions for twenty four hours after surgery or while taking narcotic pain medications or sedatives.  If you develop intractable nausea and vomiting or a severe headache please notify your doctor immediately.    FOLLOW-UP:  Please make an appointment with your surgeon as instructed. You do not need to follow up with anesthesia unless specifically instructed to do so.    WOUND CARE INSTRUCTIONS (if applicable):  Keep a dry clean dressing on the anesthesia/puncture wound site if there is drainage.  Once the wound has quit draining you may leave it open to air.  Generally you should leave the bandage intact for twenty four hours unless there is drainage.  If the epidural site drains for more than 36-48 hours please call the anesthesia department.    QUESTIONS?:  Please feel free to call your physician or the hospital  if you have any questions, and they will be happy to assist you.       [facility] Anesthesia Department  [address] [facility]  [phone]      Recovery After Procedural Sedation (Adult)  You have been given medicine by vein to make you sleep during your surgery. This may have included both a pain medicine and sleeping medicine. Most of the effects have worn off. But you may still have some drowsiness for the next 6 to 8 hours.  Home care  Follow these guidelines when you get home:  · For the next 8 hours, you should be watched by a responsible adult. This person should make sure your condition is not getting worse.  · Don't drink any alcohol for the next 24 hours.  · Don't drive, operate dangerous machinery, or make important business or personal decisions during the next 24 hours.  Note: Your healthcare provider may tell you not to take any medicine by mouth for pain or sleep in the next 4 hours.  These medicines may react with the medicines you were given in the hospital. This could cause a much stronger response than usual.  Follow-up care  Follow up with your healthcare provider if you are not alert and back to your usual level of activity within 12 hours.  When to seek medical advice  Call your healthcare provider right away if any of these occur:  · Drowsiness gets worse  · Weakness or dizziness gets worse  · Repeated vomiting  · You can't be awakened   Date Last Reviewed: 10/18/2016  © 7893-5648 [employer], [employer] [address] [place] [address], [place] [address]. All rights reserved. This information is not intended as a substitute for professional medical care. Always follow your healthcare professional's instructions.

## 2018-11-29 NOTE — [employer] PATIENT INSTRUCTIONS
Discharge Summary/Instructions after an Endoscopic Procedure  Patient Name: Heladio Lima  Patient MRN: [id number]  Patient YOB: 1945 Thursday, November 29, 2018  Wally Johnson MD  RESTRICTIONS:  During your procedure today, you received medications for sedation.  These   medications may affect your judgment, balance and coordination.  Therefore,   for 24 hours, you have the following restrictions:   - DO NOT drive a car, operate machinery, make legal/financial decisions,   sign important papers or drink alcohol.    ACTIVITY:  Today: no heavy lifting, straining or running due to procedural   sedation/anesthesia.  The following day: return to full activity including work.  DIET:  Eat and drink normally unless instructed otherwise.     TREATMENT FOR COMMON SIDE EFFECTS:  - Mild abdominal pain, nausea, belching, bloating or excessive gas:  rest,   eat lightly and use a heating pad.  - Sore Throat: treat with throat lozenges and/or gargle with warm salt   water.  - Because air was used during the procedure, expelling large amounts of air   from your rectum or belching is normal.  - If a bowel prep was taken, you may not have a bowel movement for 1-3 days.    This is normal.  SYMPTOMS TO WATCH FOR AND REPORT TO YOUR PHYSICIAN:  1. Abdominal pain or bloating, other than gas cramps.  2. Chest pain.  3. Back pain.  4. Signs of infection such as: chills or fever occurring within 24 hours   after the procedure.  5. Rectal bleeding, which would show as bright red, maroon, or black stools.   (A tablespoon of blood from the rectum is not serious, especially if   hemorrhoids are present.)  6. Vomiting.  7. Weakness or dizziness.  GO DIRECTLY TO THE NEAREST EMERGENCY ROOM IF YOU HAVE ANY OF THE FOLLOWING:      Difficulty breathing              Chills and/or fever over 101 F   Persistent vomiting and/or vomiting blood   Severe abdominal pain   Severe chest pain   Black, tarry stools   Bleeding- more than one  tablespoon   Any other symptom or condition that you feel may need urgent attention  Your doctor recommends these additional instructions:  If any biopsies were taken, your doctors clinic will contact you in 1 to 2   weeks with any results.  - Discharge patient to home.   - Patient has a contact number available for emergencies.  The signs and   symptoms of potential delayed complications were discussed with the   patient.  Return to normal activities tomorrow.  Written discharge   instructions were provided to the patient.   - Resume previous diet.   - Continue present medications.   - Resume Coumadin (warfarin) at prior dose today.   - Return to referring physician.   For questions, problems or results please call your physician - Wally Johnson MD at Work:  [phone].  [facility], EMERGENCY ROOM PHONE NUMBER: [phone]  IF A COMPLICATION OR EMERGENCY SITUATION ARISES AND YOU ARE UNABLE TO REACH   YOUR PHYSICIAN - GO DIRECTLY TO THE EMERGENCY ROOM.  Wally Johnson MD  11/29/2018 11:25:14 AM  This report has been verified and signed electronically.  [employer]

## 2018-11-29 NOTE — PLAN OF CARE
Pt states ready for discharge. No complaints of pain. Consents in chart. Discharge instructions explained; verbalizes understading.

## 2019-04-15 ENCOUNTER — TELEPHONE (OUTPATIENT)
Dept: GASTROENTEROLOGY | Facility: CLINIC | Age: 74
End: 2019-04-15

## 2019-04-15 NOTE — TELEPHONE ENCOUNTER
----- Message from Christy Ochoa sent at 4/15/2019  8:21 AM CDT -----  Contact: Self- [phone]  Alex- pt called to get order for another double balloon- states he believes he is still bleeding internally- please contact pt at [phone]

## 2019-04-15 NOTE — TELEPHONE ENCOUNTER
Returned patient's call.     Mr. Lima is calling to schedule a DBE with Dr. Johnson.     He is experiencing dark stools, but he has not seen any blood. He also denies SOB or being fatigued. His last H&H was 8.8 and 28.2.     Message routed to Dr. Johnson.

## 2019-04-16 ENCOUNTER — OFFICE VISIT (OUTPATIENT)
Dept: GASTROENTEROLOGY | Facility: CLINIC | Age: 74
End: 2019-04-16
Payer: MEDICARE

## 2019-04-16 VITALS
WEIGHT: 180.75 LBS | HEART RATE: 79 BPM | SYSTOLIC BLOOD PRESSURE: 120 MMHG | DIASTOLIC BLOOD PRESSURE: 68 MMHG | BODY MASS INDEX: 23.96 KG/M2 | HEIGHT: 73 IN

## 2019-04-16 DIAGNOSIS — R19.5 HEME POSITIVE STOOL: ICD-10-CM

## 2019-04-16 DIAGNOSIS — D50.0 IRON DEFICIENCY ANEMIA DUE TO CHRONIC BLOOD LOSS: ICD-10-CM

## 2019-04-16 DIAGNOSIS — I25.10 CORONARY ARTERY DISEASE, ANGINA PRESENCE UNSPECIFIED, UNSPECIFIED VESSEL OR LESION TYPE, UNSPECIFIED WHETHER NATIVE OR TRANSPLANTED HEART: ICD-10-CM

## 2019-04-16 DIAGNOSIS — I10 HYPERTENSION, UNSPECIFIED TYPE: ICD-10-CM

## 2019-04-16 DIAGNOSIS — K92.1 MELENA: Primary | ICD-10-CM

## 2019-04-16 DIAGNOSIS — Z79.01 ENCOUNTER FOR CURRENT LONG-TERM USE OF ANTICOAGULANTS: ICD-10-CM

## 2019-04-16 PROCEDURE — 99999 PR PBB SHADOW E&M-EST. PATIENT-LVL IV: ICD-10-PCS | Mod: PBBFAC,,, | Performed by: PHYSICIAN ASSISTANT

## 2019-04-16 PROCEDURE — 99214 PR OFFICE/OUTPT VISIT, EST, LEVL IV, 30-39 MIN: ICD-10-PCS | Mod: S$PBB,,, | Performed by: PHYSICIAN ASSISTANT

## 2019-04-16 PROCEDURE — 99999 PR PBB SHADOW E&M-EST. PATIENT-LVL IV: CPT | Mod: PBBFAC,,, | Performed by: PHYSICIAN ASSISTANT

## 2019-04-16 PROCEDURE — 99214 OFFICE O/P EST MOD 30 MIN: CPT | Mod: S$PBB,,, | Performed by: PHYSICIAN ASSISTANT

## 2019-04-16 PROCEDURE — 99214 OFFICE O/P EST MOD 30 MIN: CPT | Mod: PBBFAC | Performed by: PHYSICIAN ASSISTANT

## 2019-04-16 NOTE — PROGRESS NOTES
[facility] Consultation Note    Reason for Consult:  The primary encounter diagnosis was Melena. Diagnoses of Iron deficiency anemia due to chronic blood loss, Encounter for current long-term use of anticoagulants, Coronary artery disease, angina presence unspecified, unspecified vessel or lesion type, unspecified whether native or transplanted heart, and Hypertension, unspecified type were also pertinent to this visit.    PCP:   Cheng Garza       Referring MD:  No referring provider defined for this encounter.    HPI:  This is a 73 y.o. male here for evaluation of GI bleeding  10/17/2018 EGD for melena and iron deficiency anemia - Normal esophagus.                        - Atrophic gastritis.                        - Normal examined duodenum.                        - No specimens collected.    10/17/2018 colonoscopy for melena and iron deficiency anemia- Non-bleeding internal hemorrhoids.                        - The examination was otherwise normal.                        - No specimens collected.    11/29/2018 upper device assisted enteroscopy- Normal esophagus.                        - Atrophic and erythematous mucosa in the stomach.                        - Normal examined duodenum.                        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                         Treated with argon plasma coagulation (APC).                        - No specimens collected.    04/15/2019 occult blood in stool positive x3    04/09/2019 hemoglobin 8.8  11/14/2018 hemoglobin 9.3    Interval history  Started seeing melena 2-3 weeks ago  No abdominal pain  + SOB with activity, has improved since the iron infusion  S/p iron infusion on 4/10/19 Has another infustion this week     Originally referred to [facility] from his [facility] [place] 11/14/2018  VCE in [place] revealed a non bleeding angioectasia the proximal jejunum (see media tab)  The above GI workup was performed.     ROS:  Constitutional: No  fevers, chills, No weight loss  ENT: No allergies  CV: No chest pain  Pulm: No cough, No shortness of breath  Ophtho: No vision changes  GI: see HPI  Derm: No rash  Heme: No lymphadenopathy, No bruising  MSK: No arthritis  : No dysuria, No hematuria  Endo: No hot or cold intolerance  Neuro: No syncope, No seizure  Psych: No anxiety, No depression    Medical History:  has a past medical history of Adrenal nodule, CAD (coronary artery disease), Cancer (2001), Cancer (1992), Cancer (1996), GERD (gastroesophageal reflux disease), Head and neck cancer, Hypercholesterolemia, Hypertension, Iron deficiency anemia due to chronic blood loss (3/29/2015), 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    Surgical History:  has a past surgical history that includes Cholecystectomy; Lung lobectomy (Left, 2001); Back surgery (1982); Peripherally inserted central catheter insertion (N/A, 10/16/2018); Esophagogastroduodenoscopy (N/A, 10/17/2018); Colonoscopy (N/A, 10/17/2018); and Upper gastrointestinal endoscopy.    Family History: family history includes Diabetes in his sister; Hypertension in his father..     Social History:  reports that he has never smoked. He has never used smokeless tobacco. He reports that he does not drink alcohol or use drugs.    Review of patient's allergies indicates:  No Known Allergies    Current Outpatient Medications on File Prior to Visit   Medication Sig Dispense Refill    acetaminophen (TYLENOL) 325 MG tablet Take 2 tablets (650 mg total) by mouth every 6 (six) hours as needed.  0    carvedilol (COREG) 3.125 MG tablet Take 1.56 mg by mouth 2 (two) times daily.   5    pravastatin (PRAVACHOL) 80 MG tablet Take 1 tablet by mouth daily  3    rosuvastatin (CRESTOR) 40 MG Tab Take 10 mg by mouth every evening.      warfarin  "(COUMADIN) 5 MG tablet Take 5 mg by mouth once daily. Take 1 tablet(s) by mouth every day except 1 and 1/2 tablets on Tuesday, Thursday, and Saturday. Or as directed by [facility] doctor.      zolpidem (AMBIEN) 10 mg Tab Take 1 tablet by mouth daily  1    enalapril (VASOTEC) 5 MG tablet Take 5 mg by mouth once daily.      gabapentin (NEURONTIN) 100 MG capsule Take 1 capsule (100 mg total) by mouth 3 (three) times daily. Start 1 at night for 3 days then twice a day for 3 days then 3 times a day 30 capsule 0    gatifloxacin 0.5 % Drop drops STARTING 2 DAYS BEFORE SURGERY USE 1 GTT TO THE SURGICAL EYE QID  3    ketorolac 0.4% (ACULAR) 0.4 % Drop   3    omeprazole (PRILOSEC) 40 MG capsule Take 1 capsule (40 mg total) by mouth once daily. 30 capsule 3    prednisoLONE acetate (PRED FORTE) 1 % DrpS INT 1 GTT IN THE LEFT EYE QID  3     Current Facility-Administered Medications on File Prior to Visit   Medication Dose Route Frequency Provider Last Rate Last Dose    ferric carboxymaltose (INJECTAFER) 750 mg in sodium chloride 0.9% 265 mL infusion  750 mg Intravenous Once Jose David Joaquin MD             Objective Findings:    Vital Signs:  /68   Pulse 79   Ht 6' 1" (1.854 m)   Wt 82 kg (180 lb 12.4 oz)   BMI 23.85 kg/m²   Body mass index is 23.85 kg/m².    Physical Exam:  General Appearance: Well appearing in no acute distress  Head:   Normocephalic, without obvious abnormality  Eyes:    No scleral icterus  ENT: Neck supple, Lips, mucosa, and tongue normal  Lungs: CTA bilaterally in anterior and posterior fields, no wheezes, no crackles.  Heart:  Regular rate and rhythm, S1, S2 normal, + murmurs heard  Abdomen: Soft, non tender, non distended with positive bowel sounds in all four quadrants.   Extremities: no edema  Skin: No rash  Neurologic: AAO x 3      Labs:  Lab Results   Component Value Date    WBC 4.10 04/09/2019    HGB 8.8 (L) 04/09/2019    HCT 28.2 (L) 04/09/2019     (H) 04/09/2019    ALT 26 " 04/09/2019    AST 24 04/09/2019     04/09/2019    K 4.7 04/09/2019     04/09/2019    CREATININE 1.20 04/09/2019    BUN 17 04/09/2019    CO2 29 04/09/2019    INR 1.2 11/29/2018     Assessment:  1. Melena    2. Iron deficiency anemia due to chronic blood loss    3. Encounter for current long-term use of anticoagulants    4. Coronary artery disease, angina presence unspecified, unspecified vessel or lesion type, unspecified whether native or transplanted heart    5. Hypertension, unspecified type       73-year-old male with history of melena and GI bleeding found to have angioectasias in the proximal jejunum treated  11/2019.  He presents today with acute onset of melena times 2-3 week.  Mild drop in H&H.  Receiving iron transfusions.    Recommendations:  1.  I discussed the case with Dr. nobles who advised proceeding with the small bowel enteroscopy, rather than doing the VCE first, since the initial VCE performed was low yielding.  He will need to hold his Coumadin prior to the procedure. We will need to get cardiac clearance from his cardiologist.  He will call the office with this information. Will also request recent Echo    2.  Labs    VCE consent was signed in office today just in case we would be proceeding with VCE. I discussed with the patient the risks and benefits of the VCE that include the retention of pill and possible surgical retrieval. I also gave him the option of not proceeding with the VCE given the risk of potentially missing a tumor, and monitoring the H/H.VCE consent was signed in office today by my self and the patient and witnessed by medical assistant Yrn Barley      No follow-ups on file.      Order summary:  Orders Placed This Encounter    CBC auto differential    Case request GI: ENTEROSCOPY, DOUBLE BALLOON, ANTEGRADE         Thank you so much for allowing me to participate in the care of Heladio Mercado PA-C

## 2019-04-17 ENCOUNTER — TELEPHONE (OUTPATIENT)
Dept: GASTROENTEROLOGY | Facility: CLINIC | Age: 74
End: 2019-04-17

## 2019-04-18 ENCOUNTER — TELEPHONE (OUTPATIENT)
Dept: ENDOSCOPY | Facility: HOSPITAL | Age: 74
End: 2019-04-18

## 2019-04-18 NOTE — TELEPHONE ENCOUNTER
Spoke with patient. Balloon EGD scheduled for 5/7 at 8a pending ok to hold Coumadin. Reviewed prep instructions. Mr Lima Verbalized understanding.

## 2019-04-18 NOTE — TELEPHONE ENCOUNTER
Faxed Dr. Beny Meredith(fax [phone] phone [phone]) requesting permission to hold coumadin for device-assisted EGD. Tentatively scheduled 5/7/19.

## 2019-04-22 ENCOUNTER — TELEPHONE (OUTPATIENT)
Dept: ENDOSCOPY | Facility: HOSPITAL | Age: 74
End: 2019-04-22

## 2019-04-23 ENCOUNTER — TELEPHONE (OUTPATIENT)
Dept: ENDOSCOPY | Facility: HOSPITAL | Age: 74
End: 2019-04-23

## 2019-05-06 ENCOUNTER — TELEPHONE (OUTPATIENT)
Dept: ENDOSCOPY | Facility: HOSPITAL | Age: 74
End: 2019-05-06

## 2019-05-06 NOTE — TELEPHONE ENCOUNTER
Pt saw Dr VALENTIN Meredith 5/3/19 for f/u cardiology visit, he has not held Coumadin and will need to r/s DBE on 5/7/19 to another day. Need to know how many days Coumadin needs to be held and Dr Meredith to prescribed Lovenox bridge.

## 2019-05-14 ENCOUNTER — TELEPHONE (OUTPATIENT)
Dept: ENDOSCOPY | Facility: HOSPITAL | Age: 74
End: 2019-05-14

## 2019-05-21 ENCOUNTER — TELEPHONE (OUTPATIENT)
Dept: ENDOSCOPY | Facility: HOSPITAL | Age: 74
End: 2019-05-21

## 2019-05-21 NOTE — TELEPHONE ENCOUNTER
I spoke with patient about rescheduling Balloon EGD. He said that he is not bleeding anymore and did not think he needed procedure. Please advise if you need to see in Clinic

## 2019-05-22 ENCOUNTER — OFFICE VISIT (OUTPATIENT)
Dept: OPTOMETRY | Facility: CLINIC | Age: 74
End: 2019-05-22
Payer: MEDICARE

## 2019-05-22 DIAGNOSIS — H52.7 REFRACTIVE ERRORS: ICD-10-CM

## 2019-05-22 DIAGNOSIS — H54.50 LOW VISION OF RIGHT EYE WITH NORMAL VISION IN CONTRALATERAL EYE: ICD-10-CM

## 2019-05-22 DIAGNOSIS — Z96.1 PSEUDOPHAKIA OF BOTH EYES: ICD-10-CM

## 2019-05-22 DIAGNOSIS — H31.011 MACULAR SCAR OF RIGHT EYE: Primary | ICD-10-CM

## 2019-05-22 DIAGNOSIS — Z98.42 S/P BILATERAL CATARACT EXTRACTION: ICD-10-CM

## 2019-05-22 DIAGNOSIS — Z98.41 S/P BILATERAL CATARACT EXTRACTION: ICD-10-CM

## 2019-05-22 PROCEDURE — 92015 DETERMINE REFRACTIVE STATE: CPT | Mod: ,,, | Performed by: OPTOMETRIST

## 2019-05-22 PROCEDURE — 92014 PR EYE EXAM, EST PATIENT,COMPREHESV: ICD-10-PCS | Mod: S$PBB,,, | Performed by: OPTOMETRIST

## 2019-05-22 PROCEDURE — 92014 COMPRE OPH EXAM EST PT 1/>: CPT | Mod: S$PBB,,, | Performed by: OPTOMETRIST

## 2019-05-22 PROCEDURE — 99213 OFFICE O/P EST LOW 20 MIN: CPT | Mod: PBBFAC | Performed by: OPTOMETRIST

## 2019-05-22 PROCEDURE — 99999 PR PBB SHADOW E&M-EST. PATIENT-LVL III: CPT | Mod: PBBFAC,,, | Performed by: OPTOMETRIST

## 2019-05-22 PROCEDURE — 92015 PR REFRACTION: ICD-10-PCS | Mod: ,,, | Performed by: OPTOMETRIST

## 2019-05-22 PROCEDURE — 99999 PR PBB SHADOW E&M-EST. PATIENT-LVL III: ICD-10-PCS | Mod: PBBFAC,,, | Performed by: OPTOMETRIST

## 2019-05-22 NOTE — PROGRESS NOTES
HPI     6mos      Additional comments: General eye examination and refraction.   Pt states he needs separate pair of glasses, one for near and the other   for distance.               Comments     Patient's age: 73 y.o. AA Male  Occupation: retired  Approximate date of last eye examination:  11/15/2015  Name of last eye doctor seen:   Dr. Sanchez   City/State: [facility]  Wears glasses? Yes      If yes, wears  Full-time or part-time?  Full time   Present glasses are: Bifocal, SV Distance, SV Reading?  Progressive lens   Approximate age of present glasses:  6 mos   Got new glasses following last exam, or subsequently?:  Yes   Any problem with VA with glasses?  No, occasional blurred VA   Wears CLs?:  no   Headaches?  Yes sometimes  Eye pain/discomfort?  No                                                                             Flashes?  no  Floaters?  no  Diplopia/Double vision?  no  Patient's Ocular History:         Any eye surgeries? Cataract sx OU         Any eye injury?  no         Any treatment for eye disease?  no  Family history of eye disease?  no  Significant patient medical history:         1. Diabetes?  no       If yes, IDDM or NIDDM? no   2. HBP?  Yes controlled            ! OTC eyedrops currently using:  no   ! Prescription eye meds currently using:  no   ! Any history of allergy/adverse reaction to any eye meds used   previously?  no    ! Any history of allergy/adverse reaction to eyedrops used during prior   eye exam(s)? no    ! Any history of allergy/adverse reaction to Novacaine or similar meds?   no   ! Any history of allergy/adverse reaction to Epinephrine or similar meds?   no    ! Patient okay with use of anesthetic eyedrops to check eye pressure?    yes        ! Patient okay with use of eyedrops to dilate pupils today?  yes   !  Allergies/Medications/Medical History/Family History reviewed today?    yes      PD =     Desired reading distance =                                                                        Last edited by Mil Sanchez, OD on 5/22/2019  3:02 PM. (History)            Assessment /Plan     For exam results, see Encounter Report.    1. Macular scar of right eye     2. Low vision of right eye with normal vision in contralateral eye     3. S/P bilateral cataract extraction     4. Pseudophakia of both eyes     5. Refractive errors                    S/P cataract surgery in both eyes, with bilateral posterior chamber intraocular lens.  S/P YAG laser posterior capsulotomy in both eyes.  Chorioretinitis vs laser scarring extending into macular region in the right eye.  Poor correctable VA/low vision  in the right eye secondarily.  Residual refractive error in each eye.  Stable refraction in each eye.  New (duplicate) spectacle lens Rx issued.  Recommend full-time wear.  Recheck in six months - will send recall

## 2019-05-22 NOTE — PATIENT INSTRUCTIONS
S/P cataract surgery in both eyes, with bilateral posterior chamber intraocular lens.  S/P YAG laser posterior capsulotomy in both eyes.  Chorioretinitis vs laser scarring extending into macular region in the right eye.  Poor correctable VA/low vision  in the right eye secondarily.  Residual refractive error in each eye.  Stable refraction in each eye.  New (duplicate) spectacle lens Rx issued.  Recommend full-time wear.  Recheck in six months - will send recall

## 2019-05-31 NOTE — TELEPHONE ENCOUNTER
MD Shaila Chapman MA   Caller: Unspecified (1 week ago)             OK to cancel for now.  If he sees any more signs of bleeding he needs to contact our office and follow-up with Daniel Rae

## 2019-06-21 ENCOUNTER — TELEPHONE (OUTPATIENT)
Dept: GASTROENTEROLOGY | Facility: CLINIC | Age: 74
End: 2019-06-21

## 2019-06-21 ENCOUNTER — OFFICE VISIT (OUTPATIENT)
Dept: GASTROENTEROLOGY | Facility: CLINIC | Age: 74
End: 2019-06-21
Payer: MEDICARE

## 2019-06-21 VITALS
HEIGHT: 73 IN | BODY MASS INDEX: 23.96 KG/M2 | DIASTOLIC BLOOD PRESSURE: 89 MMHG | SYSTOLIC BLOOD PRESSURE: 146 MMHG | HEART RATE: 80 BPM | WEIGHT: 180.75 LBS

## 2019-06-21 DIAGNOSIS — K92.1 MELENA: Primary | ICD-10-CM

## 2019-06-21 PROCEDURE — 99213 PR OFFICE/OUTPT VISIT, EST, LEVL III, 20-29 MIN: ICD-10-PCS | Mod: S$PBB,,, | Performed by: INTERNAL MEDICINE

## 2019-06-21 PROCEDURE — 99213 OFFICE O/P EST LOW 20 MIN: CPT | Mod: PBBFAC | Performed by: INTERNAL MEDICINE

## 2019-06-21 PROCEDURE — 99999 PR PBB SHADOW E&M-EST. PATIENT-LVL III: ICD-10-PCS | Mod: PBBFAC,,, | Performed by: INTERNAL MEDICINE

## 2019-06-21 PROCEDURE — 99999 PR PBB SHADOW E&M-EST. PATIENT-LVL III: CPT | Mod: PBBFAC,,, | Performed by: INTERNAL MEDICINE

## 2019-06-21 PROCEDURE — 99213 OFFICE O/P EST LOW 20 MIN: CPT | Mod: S$PBB,,, | Performed by: INTERNAL MEDICINE

## 2019-06-21 NOTE — PROGRESS NOTES
REASON FOR VISIT:  Melena.     HISTORY OF PRESENT ILLNESS:  Mr. Lima was previously seen in [facility] for chronic anemia secondary to suspected obscure GI bleed.  He   is on Coumadin for DVT and had melena with progressive anemia.  He recently got   transfused 2 units of packed red blood cells and had to be re-transfused   another 2 units.  Recent EGD and colonoscopy were fine and a video capsule   endoscopy that was done by Dr. 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  He underwent   an anterograde balloon enteroscopy which revealed a few nonbleeding jejunal AVMs which were cauterized.  He continues to have intermittent melena since he is on anticoagulation with Coumadin.  His most recent iron profile in May and hemoglobin/hematocrit has improved significantly.  He continues to complain of black   stools that last for 1-2 days and then normalizes, although he is not on oral iron supplementation.  He is being followed by Dr. Lim ( hematologist) with iron infusions.   Denies any abdominal   pain, nausea or vomiting.  No hematemesis, maroon stool or bright red blood per   rectum.  He takes a baby aspirin every day and he is also on Coumadin.     PAST MEDICAL, SURGICAL, SOCIAL AND FAMILY HISTORY:  Reviewed.     MEDICATIONS AND ALLERGIES:  Reviewed.     REVIEW OF SYSTEMS:  CONSTITUTIONAL:  No fever, no chills, no weight loss.  Appetite is normal.  EYES:  No visual change.  ENT:  No odynophagia or hoarseness of voice.  CARDIOVASCULAR:  No angina or palpitations.  RESPIRATORY:  No shortness of breath or wheezing.  GASTROINTESTINAL:  See HPI.     PHYSICAL EXAMINATION:  VITAL SIGNS:  See EPIC.  GENERAL:  Awake, alert and oriented x3, in no acute distress.  15 min was spent with the patient with more than 50% in counseling.      IMPRESSION:   Intermittent melena secondary to small-bowel bleeding from  AVMs in the setting of  anticoagulation.     RECOMMENDATION:  1. Check CBC, iron TIBC and ferritin today.  Further recommendation   based on above. Proceed with anterograde balloon-assisted enteroscopy if evidence of significant anemia.  Otherwise close monitoring of blood count and iron profile by his hematologist should suffice unless his transfusion burden increases.

## 2019-08-02 ENCOUNTER — TELEPHONE (OUTPATIENT)
Dept: GASTROENTEROLOGY | Facility: CLINIC | Age: 74
End: 2019-08-02

## 2019-08-02 NOTE — TELEPHONE ENCOUNTER
----- Message from Ginger Nunez sent at 8/2/2019 12:10 PM CDT -----  Contact: pt  Please [phone]   Needs soon appt     Pt said he is burning in colon   Started when have bowel moverment   But now all the time    Fist Available in Epic   Oct 25    Too far awaly     Please call with a work in  Thanks

## 2019-08-30 ENCOUNTER — TELEPHONE (OUTPATIENT)
Dept: ORTHOPEDICS | Facility: CLINIC | Age: 74
End: 2019-08-30

## 2019-09-03 ENCOUNTER — OFFICE VISIT (OUTPATIENT)
Dept: ORTHOPEDICS | Facility: CLINIC | Age: 74
End: 2019-09-03
Payer: MEDICARE

## 2019-09-03 VITALS
BODY MASS INDEX: 22.16 KG/M2 | DIASTOLIC BLOOD PRESSURE: 68 MMHG | HEIGHT: 76 IN | WEIGHT: 182 LBS | HEART RATE: 71 BPM | SYSTOLIC BLOOD PRESSURE: 128 MMHG

## 2019-09-03 DIAGNOSIS — M19.019 SHOULDER ARTHRITIS: Primary | ICD-10-CM

## 2019-09-03 DIAGNOSIS — M54.2 NECK PAIN: ICD-10-CM

## 2019-09-03 PROCEDURE — 99213 OFFICE O/P EST LOW 20 MIN: CPT | Mod: PBBFAC,25 | Performed by: ORTHOPAEDIC SURGERY

## 2019-09-03 PROCEDURE — 99204 PR OFFICE/OUTPT VISIT, NEW, LEVL IV, 45-59 MIN: ICD-10-PCS | Mod: 25,S$PBB,, | Performed by: ORTHOPAEDIC SURGERY

## 2019-09-03 PROCEDURE — 20610 DRAIN/INJ JOINT/BURSA W/O US: CPT | Mod: PBBFAC | Performed by: ORTHOPAEDIC SURGERY

## 2019-09-03 PROCEDURE — 99999 PR PBB SHADOW E&M-EST. PATIENT-LVL III: CPT | Mod: PBBFAC,,, | Performed by: ORTHOPAEDIC SURGERY

## 2019-09-03 PROCEDURE — 99999 PR PBB SHADOW E&M-EST. PATIENT-LVL III: ICD-10-PCS | Mod: PBBFAC,,, | Performed by: ORTHOPAEDIC SURGERY

## 2019-09-03 PROCEDURE — 99204 OFFICE O/P NEW MOD 45 MIN: CPT | Mod: 25,S$PBB,, | Performed by: ORTHOPAEDIC SURGERY

## 2019-09-03 PROCEDURE — 20610 LARGE JOINT ASPIRATION/INJECTION: R GLENOHUMERAL: ICD-10-PCS | Mod: S$PBB,RT,, | Performed by: ORTHOPAEDIC SURGERY

## 2019-09-03 RX ADMIN — TRIAMCINOLONE ACETONIDE 80 MG: 40 INJECTION, SUSPENSION INTRA-ARTICULAR; INTRAMUSCULAR at 03:09

## 2019-09-03 NOTE — PROCEDURES
Large Joint Aspiration/Injection: R glenohumeral  Date/Time: 9/3/2019 3:56 PM  Performed by: Litzy Parry MD  Authorized by: Litzy aPrry MD     Consent Done?:  Yes (Verbal)  Indications:  Pain  Timeout: Prior to procedure the correct patient, procedure, and site was verified    Anesthesia  Local anesthesia used  Anesthesia: local infiltration  Anesthetic: lidocaine 1% without epinephrine    Location:  Shoulder  Site:  R glenohumeral  Prep: Patient was prepped and draped in usual sterile fashion    Needle size:  22 G  Ultrasonic Guidance for needle placement: No  Approach:  Posterior  Medications:  80 mg triamcinolone acetonide 40 mg/mL

## 2019-09-03 NOTE — PROGRESS NOTES
I have personally taken the history and examined this patient. I agree with the resident's note as stated above.  Plan for shoulder injection, CC, PT.  Pt has neck and shoulder pain. Plan for spine referral.

## 2019-09-04 RX ORDER — TRIAMCINOLONE ACETONIDE 40 MG/ML
80 INJECTION, SUSPENSION INTRA-ARTICULAR; INTRAMUSCULAR
Status: DISCONTINUED | OUTPATIENT
Start: 2019-09-03 | End: 2019-09-04 | Stop reason: HOSPADM

## 2019-12-17 ENCOUNTER — OFFICE VISIT (OUTPATIENT)
Dept: OPTOMETRY | Facility: CLINIC | Age: 74
End: 2019-12-17
Payer: MEDICARE

## 2019-12-17 DIAGNOSIS — H31.011 MACULAR SCAR OF RIGHT EYE: Primary | ICD-10-CM

## 2019-12-17 DIAGNOSIS — Z98.42 S/P BILATERAL CATARACT EXTRACTION: ICD-10-CM

## 2019-12-17 DIAGNOSIS — Z96.1 PSEUDOPHAKIA OF BOTH EYES: ICD-10-CM

## 2019-12-17 DIAGNOSIS — H54.50 LOW VISION OF RIGHT EYE WITH NORMAL VISION IN CONTRALATERAL EYE: ICD-10-CM

## 2019-12-17 DIAGNOSIS — Z98.41 S/P BILATERAL CATARACT EXTRACTION: ICD-10-CM

## 2019-12-17 PROCEDURE — 99213 OFFICE O/P EST LOW 20 MIN: CPT | Mod: PBBFAC | Performed by: OPTOMETRIST

## 2019-12-17 PROCEDURE — 92012 INTRM OPH EXAM EST PATIENT: CPT | Mod: S$PBB,,, | Performed by: OPTOMETRIST

## 2019-12-17 PROCEDURE — 99999 PR PBB SHADOW E&M-EST. PATIENT-LVL III: CPT | Mod: PBBFAC,,, | Performed by: OPTOMETRIST

## 2019-12-17 PROCEDURE — 99999 PR PBB SHADOW E&M-EST. PATIENT-LVL III: ICD-10-PCS | Mod: PBBFAC,,, | Performed by: OPTOMETRIST

## 2019-12-17 PROCEDURE — 92012 PR EYE EXAM, EST PATIENT,INTERMED: ICD-10-PCS | Mod: S$PBB,,, | Performed by: OPTOMETRIST

## 2019-12-17 RX ORDER — LOSARTAN POTASSIUM 25 MG/1
TABLET ORAL
Refills: 11 | COMMUNITY
Start: 2019-11-25

## 2019-12-17 NOTE — PATIENT INSTRUCTIONS
S/P cataract surgery in both eyes, with bilateral posterior chamber intraocular lens.  S/P YAG laser posterior capsulotomy in both eyes.  Prrior finding of chorioretinitis vs laser scarring extending into macular region in the right eye, but not visualized today, as Mr. Lima declines pupillary dilation today.   Poor correctable VA in the right eye.  Residual refractive error in each eye.    New spectacle lens Rx issued.  Recommend full-time wear.  Recheck in six months - will send recall

## 2019-12-17 NOTE — PROGRESS NOTES
HPI     Patient in for progress check.  Patient is here for a 6 month f/u on VA   Being followed for (diagnosis):   Macular scar of right eye     Date last seen:  05/22/2019    Doctor last seen:       Prescribed eye medications(s) using:  no    OTC eye medication(s) using:  no    Signs/symptoms of condition resolved/better/stable/worse?:              Last edited by Mil Sanchez, OD on 12/17/2019  1:19 PM. (History)            Assessment /Plan     For exam results, see Encounter Report.    1. Macular scar of right eye     2. Low vision of right eye with normal vision in contralateral eye     3. S/P bilateral cataract extraction     4. Pseudophakia of both eyes                  S/P cataract surgery in both eyes, with bilateral posterior chamber intraocular lens.  S/P YAG laser posterior capsulotomy in both eyes.  Prrior finding of chorioretinitis vs laser scarring extending into macular region in the right eye, but not visualized today, as Mr. Lima declines pupillary dilation today.   Poor correctable VA in the right eye.  Residual refractive error in each eye.    New spectacle lens Rx issued.  Recommend full-time wear.  Recheck in six months - will send recall

## 2020-06-11 NOTE — PROGRESS NOTES
CC:  Hemorrhoids    HPI:  Pt is   73 y.o.  male with a history of iron def anemia and melanotic stools after starting iron infusions. Pt required transfusions back in Oct and underwent EGD and colonoscopy with no obvious source of bleeding. Findings below. Pt reports melanotic stools but denies any rectal/anal pain, BRBPR, or blood while wiping. Reports formed daily stools.     Findings:       The esophagus was normal.       Mild inflammation was found in the entire examined stomach.       The examined duodenum was normal.  Impression:           - Normal esophagus.                        - Atrophic gastritis.                        - Normal examined duodenum.                        - No specimens collected.  Estimated Blood Loss: Estimated blood loss: none.  Recommendation:       - Return patient to hospital [facility] for ongoing care.                        - Perform a colonoscopy today.    Findings:       The perianal and digital rectal examinations were normal.       Non-bleeding internal hemorrhoids were found during retroflexion.        The hemorrhoids were small.       The exam was otherwise without abnormality.  Impression:           - Non-bleeding internal hemorrhoids.                        - The examination was otherwise normal.                        - No specimens collected.      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        Past Surgical History:    Procedure Laterality Date    BACK SURGERY  1982    CHOLECYSTECTOMY      COLONOSCOPY N/A 10/17/2018    Procedure: COLONOSCOPY;  Surgeon: Srinivasan Martinez MD;  Location: [facility] ENDO;  Service: Endoscopy;  Laterality: N/A;    COLONOSCOPY N/A 10/17/2018    Performed by Srinivasan Martinez MD at [facility] ENDO    EGD (ESOPHAGOGASTRODUODENOSCOPY) N/A 10/17/2018    Performed by S[facility]ivasan Martinez MD at [facility] ENDO    ERCP N/A 9/9/2014    Performed by Asael Buckley MD at [facility] (2ND FLR)    ESOPHAGOGASTRODUODENOSCOPY N/A 10/17/2018    Procedure: EGD (ESOPHAGOGASTRODUODENOSCOPY);  Surgeon: Srinivasan Martinez MD;  Location: [facility] ENDO;  Service: Endoscopy;  Laterality: N/A;    ESOPHAGOGASTRODUODENOSCOPY (EGD) N/A 8/19/2015    Performed by Asael Buckley MD at [facility] (2ND FLR)    INSERTION, PICC N/A 10/16/2018    Performed by [facility] Diagnostic Provider at [facility] OR    LUNG LOBECTOMY Left 2001    Left lower lobe    PERIPHERALLY INSERTED CENTRAL CATHETER INSERTION N/A 10/16/2018    Procedure: INSERTION, PICC;  Surgeon: [facility] Diagnostic Provider;  Location: [facility] OR;  Service: General;  Laterality: N/A;    ULTRASOUND-ENDOSCOPIC-UPPER N/A 2/24/2015    Performed by Asael Buckley MD at [facility] (2ND FLR)    ULTRASOUND-ENDOSCOPIC-UPPER N/A 8/6/2014    Performed by Asael Buckley MD at [facility] (2ND FLR)       Review of patient's allergies indicates:  No Known Allergies    Family History   Problem Relation Age of Onset    Hypertension Father     Diabetes Sister        Social History     Socioeconomic History    Marital status:      Spouse name: None    Number of children: None    Years of education: None    Highest education level: None   Social Needs    Financial resource strain: None    Food insecurity - worry: None    Food insecurity - inability: None    Transportation needs - medical: None    Transportation needs - non-medical: None   Occupational History    None   Tobacco Use    Smoking  "status: Never Smoker    Smokeless tobacco: Never Used   Substance and Sexual Activity    Alcohol use: No    Drug use: No    Sexual activity: Yes     Partners: Female   Other Topics Concern    None   Social History Narrative    None       ROS:  GENERAL: No fever, chills, fatigability or weight loss.  Integument: No rashes, redness, icterus  CHEST: Denies MICHELE, cyanosis, wheezing, cough and sputum production.  CARDIOVASCULAR: Denies chest pain, PND, orthopnea or reduced exercise tolerance.  GI: Denies abd pain, dysphagia, nausea, vomiting, no hematemesis   : Denies burning on urination, no hematuria, no bacteriuria  MSK: No deformities, swelling, joint pain swelling  Neurologic: No HAs, seizures, weakness, paresthesias, gait problems    PE:  General appearance  NAD  BP (!) 140/75 (BP Location: Right arm, Patient Position: Sitting, BP Method: Large (Automatic))   Pulse 76   Ht 6' 1" (1.854 m)   Wt 82 kg (180 lb 12.8 oz)   BMI 23.85 kg/m²   Skin   AT NC EOMI   Neck supple, trachea midline, thyroid non enlarged.     Chest symmetric, normal excursions, no retractions  Breathing comfortably  Rectal: deffered  EXT no CCE  Neuro -  AAOx3, moves all ext purposefully,    Assessment:  1. Iron deficiency anemia, melanotic stools, unknown source, does not appear hemorrhoidal in nature    Plan:  1. Will attempt to setup GI medicine appt here today      I have personally obtained a history and performed a physical exam with and independent to my resident and discussed the findings and plan with the patient.  I agree with the above findings and plan with the following exceptions:  None    This was most likely an inappropriate appointment given the history of gastritis and anemia.  Patient would be best served by Gastroenterology evaluation.  I will not charge him for my time or this visit.    Srinivasan SAMANO MD, FACS, FASCRS  Staff Surgeon  Dept of Colon and Rectal Surgery  [facility] [place], [place]      " No

## 2022-06-15 PROBLEM — I82.C21 CHRONIC EMBOLISM AND THROMBOSIS OF RIGHT INTERNAL JUGULAR VEIN: Status: ACTIVE | Noted: 2022-06-15

## 2022-11-16 PROBLEM — M25.511 CHRONIC RIGHT SHOULDER PAIN: Status: ACTIVE | Noted: 2022-11-16

## 2022-11-16 PROBLEM — G89.29 CHRONIC RIGHT SHOULDER PAIN: Status: ACTIVE | Noted: 2022-11-16

## 2022-11-16 PROBLEM — R13.10 DYSPHAGIA: Status: ACTIVE | Noted: 2022-11-16

## 2022-11-23 ENCOUNTER — TELEPHONE (OUTPATIENT)
Dept: OPHTHALMOLOGY | Facility: CLINIC | Age: 77
End: 2022-11-23
Payer: MEDICARE

## 2022-11-23 NOTE — TELEPHONE ENCOUNTER
----- Message from Kei Kiser sent at 11/23/2022 10:54 AM CST -----  Contact: Edilma [phone]  Pt daughter is calling because her father has a growth on the corner of his eye that she states is now close to his pupil. He states that his vision is getting darker. Please call back to further assist.

## 2023-01-04 ENCOUNTER — CLINICAL SUPPORT (OUTPATIENT)
Dept: OPHTHALMOLOGY | Facility: CLINIC | Age: 78
End: 2023-01-04
Payer: MEDICARE

## 2023-01-04 ENCOUNTER — OFFICE VISIT (OUTPATIENT)
Dept: OPTOMETRY | Facility: CLINIC | Age: 78
End: 2023-01-04
Payer: MEDICARE

## 2023-01-04 DIAGNOSIS — H53.453 OTHER LOCALIZED VISUAL FIELD DEFECT, BILATERAL: ICD-10-CM

## 2023-01-04 DIAGNOSIS — H53.10 SUBJECTIVE VISUAL DISTURBANCE: Primary | ICD-10-CM

## 2023-01-04 DIAGNOSIS — H31.011 MACULAR SCAR, RIGHT: ICD-10-CM

## 2023-01-04 DIAGNOSIS — H35.81 RETINAL EDEMA OF LEFT EYE: ICD-10-CM

## 2023-01-04 DIAGNOSIS — H53.481 GENERALIZED CONTRACTION OF VISUAL FIELD, RIGHT EYE: ICD-10-CM

## 2023-01-04 DIAGNOSIS — Z01.00 COMPLETE EYE EXAM, ENCOUNTER FOR: ICD-10-CM

## 2023-01-04 DIAGNOSIS — H53.10 SUBJECTIVE VISUAL DISTURBANCE: ICD-10-CM

## 2023-01-04 DIAGNOSIS — H53.40 VISUAL FIELD DEFECTS: ICD-10-CM

## 2023-01-04 PROCEDURE — 92250 PR FUNDAL PHOTOGRAPHY: ICD-10-PCS | Mod: S$GLB,,, | Performed by: OPTOMETRIST

## 2023-01-04 PROCEDURE — 3288F FALL RISK ASSESSMENT DOCD: CPT | Mod: CPTII,S$GLB,, | Performed by: OPTOMETRIST

## 2023-01-04 PROCEDURE — 3288F PR FALLS RISK ASSESSMENT DOCUMENTED: ICD-10-PCS | Mod: CPTII,S$GLB,, | Performed by: OPTOMETRIST

## 2023-01-04 PROCEDURE — 1126F AMNT PAIN NOTED NONE PRSNT: CPT | Mod: CPTII,S$GLB,, | Performed by: OPTOMETRIST

## 2023-01-04 PROCEDURE — 92250 FUNDUS PHOTOGRAPHY W/I&R: CPT | Mod: S$GLB,,, | Performed by: OPTOMETRIST

## 2023-01-04 PROCEDURE — 92004 COMPRE OPH EXAM NEW PT 1/>: CPT | Mod: S$GLB,,, | Performed by: OPTOMETRIST

## 2023-01-04 PROCEDURE — 1159F PR MEDICATION LIST DOCUMENTED IN MEDICAL RECORD: ICD-10-PCS | Mod: CPTII,S$GLB,, | Performed by: OPTOMETRIST

## 2023-01-04 PROCEDURE — 92015 PR REFRACTION: ICD-10-PCS | Mod: S$GLB,,, | Performed by: OPTOMETRIST

## 2023-01-04 PROCEDURE — 99999 PR PBB SHADOW E&M-EST. PATIENT-LVL III: CPT | Mod: PBBFAC,,, | Performed by: OPTOMETRIST

## 2023-01-04 PROCEDURE — 92015 DETERMINE REFRACTIVE STATE: CPT | Mod: S$GLB,,, | Performed by: OPTOMETRIST

## 2023-01-04 PROCEDURE — 1101F PR PT FALLS ASSESS DOC 0-1 FALLS W/OUT INJ PAST YR: ICD-10-PCS | Mod: CPTII,S$GLB,, | Performed by: OPTOMETRIST

## 2023-01-04 PROCEDURE — 1126F PR PAIN SEVERITY QUANTIFIED, NO PAIN PRESENT: ICD-10-PCS | Mod: CPTII,S$GLB,, | Performed by: OPTOMETRIST

## 2023-01-04 PROCEDURE — 99999 PR PBB SHADOW E&M-EST. PATIENT-LVL III: ICD-10-PCS | Mod: PBBFAC,,, | Performed by: OPTOMETRIST

## 2023-01-04 PROCEDURE — 1159F MED LIST DOCD IN RCRD: CPT | Mod: CPTII,S$GLB,, | Performed by: OPTOMETRIST

## 2023-01-04 PROCEDURE — 92004 PR EYE EXAM, NEW PATIENT,COMPREHESV: ICD-10-PCS | Mod: S$GLB,,, | Performed by: OPTOMETRIST

## 2023-01-04 PROCEDURE — 1101F PT FALLS ASSESS-DOCD LE1/YR: CPT | Mod: CPTII,S$GLB,, | Performed by: OPTOMETRIST

## 2023-01-04 NOTE — PROGRESS NOTES
HPI    DLS: With Dr. Sanchez 12/17/2019    Pt here for Routine Eye Exam;  Pt states vision seems to be blurry. Pt states he does notice floaters off   and on but denies any flashes or diplopia.    Meds;  No GTTS  Last edited by Radha Main on 1/4/2023 11:15 AM.        ROS    Negative for: Constitutional, Gastrointestinal, Neurological, Skin,   Genitourinary, Musculoskeletal, HENT, Endocrine, Cardiovascular, Eyes,   Respiratory, Psychiatric, Allergic/Imm, Heme/Lymph  Last edited by Yaa Bolanos, OD on 1/4/2023 12:06 PM.        Assessment /Plan     For exam results, see Encounter Report.    Complete eye exam, encounter for    Macular scar, right  -     Color Fundus Photography - OU - Both Eyes; Future  -     OCT, Optic Nerve - OU - Both Eyes; Future  -     OCT, Retina - OU - Both Eyes; Future    Subjective visual disturbance  -     OCT, Optic Nerve - OU - Both Eyes; Future    Visual field defects  -     Color Fundus Photography - OU - Both Eyes; Future  -     OCT, Optic Nerve - OU - Both Eyes; Future  -     OCT, Retina - OU - Both Eyes; Future    Other localized visual field defect, bilateral  -     OCT, Optic Nerve - OU - Both Eyes; Future    Generalized contraction of visual field, right eye  -     OCT, Retina - OU - Both Eyes; Future      MONITOR. ED PT ON ALL EXAM FINDINGS  REDUCED VISION OD; LONGSTANDING SECONDARY TO MACULAR SCAR   REDUCED VISION OS; NO SIGNIFICANT IMPROVEMENT W/ REFRACTION; SECONDARY TO MACULAR EDEMA; REFER TO RETINA FOR CONSULT   DDX: DM VS. telangiectasia RETINA ; DENIES H/O DM; CONSIDER LAB TESTING W/ PCP   RTC 1 YR//PRN FOR REE/DFE

## 2023-01-04 NOTE — PROGRESS NOTES
FUNDS PIX DONE  OU     OPTOS MACHINE     MAC OCT DONE OU      RNFL  OCT DONE OU     JTHOMAS            Assessment /Plan     For exam results, see Encounter Report.    Macular scar, right  -     Color Fundus Photography - OU - Both Eyes  -     OCT, Optic Nerve - OU - Both Eyes  -     OCT, Retina - OU - Both Eyes    Visual field defects  -     Color Fundus Photography - OU - Both Eyes  -     OCT, Optic Nerve - OU - Both Eyes  -     OCT, Retina - OU - Both Eyes    Subjective visual disturbance  -     OCT, Optic Nerve - OU - Both Eyes    Other localized visual field defect, bilateral  -     OCT, Optic Nerve - OU - Both Eyes    Generalized contraction of visual field, right eye  -     OCT, Retina - OU - Both Eyes

## 2023-01-20 ENCOUNTER — OFFICE VISIT (OUTPATIENT)
Dept: OPHTHALMOLOGY | Facility: CLINIC | Age: 78
End: 2023-01-20
Payer: MEDICARE

## 2023-01-20 DIAGNOSIS — Z96.1 PSEUDOPHAKIA OF BOTH EYES: ICD-10-CM

## 2023-01-20 DIAGNOSIS — E11.3212 MILD NONPROLIFERATIVE DIABETIC RETINOPATHY OF LEFT EYE WITH MACULAR EDEMA ASSOCIATED WITH TYPE 2 DIABETES MELLITUS: Primary | ICD-10-CM

## 2023-01-20 DIAGNOSIS — E11.3291 MILD NONPROLIFERATIVE DIABETIC RETINOPATHY OF RIGHT EYE WITHOUT MACULAR EDEMA ASSOCIATED WITH TYPE 2 DIABETES MELLITUS: ICD-10-CM

## 2023-01-20 DIAGNOSIS — H31.011 MACULAR SCAR, RIGHT: ICD-10-CM

## 2023-01-20 PROCEDURE — 92134 CPTRZ OPH DX IMG PST SGM RTA: CPT | Mod: S$GLB,,, | Performed by: OPHTHALMOLOGY

## 2023-01-20 PROCEDURE — 99999 PR PBB SHADOW E&M-EST. PATIENT-LVL III: ICD-10-PCS | Mod: PBBFAC,,, | Performed by: OPHTHALMOLOGY

## 2023-01-20 PROCEDURE — 67028 PR INJECT INTRAVITREAL PHARMCOLOGIC: ICD-10-PCS | Mod: LT,S$GLB,, | Performed by: OPHTHALMOLOGY

## 2023-01-20 PROCEDURE — 67028 INJECTION EYE DRUG: CPT | Mod: LT,S$GLB,, | Performed by: OPHTHALMOLOGY

## 2023-01-20 PROCEDURE — 92134 OCT, RETINA - OU - BOTH EYES: ICD-10-PCS | Mod: S$GLB,,, | Performed by: OPHTHALMOLOGY

## 2023-01-20 PROCEDURE — 1160F RVW MEDS BY RX/DR IN RCRD: CPT | Mod: CPTII,S$GLB,, | Performed by: OPHTHALMOLOGY

## 2023-01-20 PROCEDURE — 99204 PR OFFICE/OUTPT VISIT, NEW, LEVL IV, 45-59 MIN: ICD-10-PCS | Mod: 25,S$GLB,, | Performed by: OPHTHALMOLOGY

## 2023-01-20 PROCEDURE — 1101F PR PT FALLS ASSESS DOC 0-1 FALLS W/OUT INJ PAST YR: ICD-10-PCS | Mod: CPTII,S$GLB,, | Performed by: OPHTHALMOLOGY

## 2023-01-20 PROCEDURE — 1126F AMNT PAIN NOTED NONE PRSNT: CPT | Mod: CPTII,S$GLB,, | Performed by: OPHTHALMOLOGY

## 2023-01-20 PROCEDURE — 1126F PR PAIN SEVERITY QUANTIFIED, NO PAIN PRESENT: ICD-10-PCS | Mod: CPTII,S$GLB,, | Performed by: OPHTHALMOLOGY

## 2023-01-20 PROCEDURE — 1159F MED LIST DOCD IN RCRD: CPT | Mod: CPTII,S$GLB,, | Performed by: OPHTHALMOLOGY

## 2023-01-20 PROCEDURE — 99999 PR PBB SHADOW E&M-EST. PATIENT-LVL III: CPT | Mod: PBBFAC,,, | Performed by: OPHTHALMOLOGY

## 2023-01-20 PROCEDURE — 1159F PR MEDICATION LIST DOCUMENTED IN MEDICAL RECORD: ICD-10-PCS | Mod: CPTII,S$GLB,, | Performed by: OPHTHALMOLOGY

## 2023-01-20 PROCEDURE — 1160F PR REVIEW ALL MEDS BY PRESCRIBER/CLIN PHARMACIST DOCUMENTED: ICD-10-PCS | Mod: CPTII,S$GLB,, | Performed by: OPHTHALMOLOGY

## 2023-01-20 PROCEDURE — 3288F FALL RISK ASSESSMENT DOCD: CPT | Mod: CPTII,S$GLB,, | Performed by: OPHTHALMOLOGY

## 2023-01-20 PROCEDURE — 1101F PT FALLS ASSESS-DOCD LE1/YR: CPT | Mod: CPTII,S$GLB,, | Performed by: OPHTHALMOLOGY

## 2023-01-20 PROCEDURE — 99204 OFFICE O/P NEW MOD 45 MIN: CPT | Mod: 25,S$GLB,, | Performed by: OPHTHALMOLOGY

## 2023-01-20 PROCEDURE — 3288F PR FALLS RISK ASSESSMENT DOCUMENTED: ICD-10-PCS | Mod: CPTII,S$GLB,, | Performed by: OPHTHALMOLOGY

## 2023-01-20 RX ORDER — AMLODIPINE BESYLATE 10 MG/1
10 TABLET ORAL
COMMUNITY
Start: 2023-01-17 | End: 2023-09-01 | Stop reason: DRUGHIGH

## 2023-01-20 RX ADMIN — Medication 1.25 MG: at 11:01

## 2023-01-20 NOTE — PROGRESS NOTES
HPI    Pt sts he can't see. Feels like something is in eye. Some flashes and   floaters. OD has a blind spot in it.  Last edited by Sarah Gardner on 1/20/2023 10:31 AM.         A/P    ICD-10-CM ICD-9-CM   1. 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 Mild nonproliferative diabetic retinopathy of right eye without macular edema associated with type 2 diabetes mellitus  E11.3291 250.50     362.04   3. Macular scar, right  H31.011 363.32   4. Pseudophakia of both eyes  Z96.1 V43.1       1. 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 Mild nonproliferative diabetic retinopathy of right eye without macular edema associated with type 2 diabetes mellitus  Referral from Dr. Bolanos for retina check  PCP Charly Correia MD  No recent A1C     OD: mac scar, few scant IRH in periphery  Plan: Observation    OS: significant foveal IRF, peripheral few MA, pt states nto diabetic- needs PCP f/u  Plan: needs SEGUNDO OS for significant foveal IRF  Based on todays exam, diagnostic studies, and review of records, the determination was made for treatment today.  Schedule Avastin Injection today Left Eye Patient chooses to proceed with injection R/B/A discussed include infection retinal detachment and stroke    Patient identified.  Timeout performed.    Risks, benefits, and alternatives to treatment were discussed in detail with the patient, including bleeding/infection (endophthalmitis)/etc.  The patient voiced understanding and wished to proceed with the procedure.  See separate consent form.    Injection Procedure Note:  Diagnosis: DME Left EyeLeft    Topical Proparacaine drop placed then topical 5% Betadine and then subconjunctival lidocaine 2% injection  Sterile gloves used, and sterile lid speculum placed.  5% Betadine placed at injection site  again prior to injection.  Avastin 1.25mg in 0.05cc Injected inferotemporally 3.5-4mm posterior to the limbus.  Complications: None  Va at least CF at 5 feet post injection.  Retina, ONH, IOP normal after injection.    Followup as below.  Patient should return immediately PRN.  Retinal Detachment and Endophthalmitis precautions given.     3. Macular scar, right  Long standing macular scar  Plan: Observation  Monocular precautions    4. Pseudophakia of both eyes  Good lens position OU  Plan: Observation, update Mrx prn    RTC 1 mo DFE/OCTm OU, SEGUNDO OS - NEEDS PCP f/u for Diabetes check      I saw and examined the patient and reviewed in detail the findings documented. The final examination findings, image interpretations, and plan as documented in the record represent my personal judgment and conclusions.    Dima Mayes MD  Vitreoretinal Surgery   [facility]

## 2023-02-23 ENCOUNTER — PROCEDURE VISIT (OUTPATIENT)
Dept: OPHTHALMOLOGY | Facility: CLINIC | Age: 78
End: 2023-02-23
Payer: MEDICARE

## 2023-02-23 ENCOUNTER — TELEPHONE (OUTPATIENT)
Dept: OPHTHALMOLOGY | Facility: CLINIC | Age: 78
End: 2023-02-23
Payer: MEDICARE

## 2023-02-23 DIAGNOSIS — E11.3212 MILD NONPROLIFERATIVE DIABETIC RETINOPATHY OF LEFT EYE WITH MACULAR EDEMA ASSOCIATED WITH TYPE 2 DIABETES MELLITUS: Primary | ICD-10-CM

## 2023-02-23 DIAGNOSIS — E11.3291 MILD NONPROLIFERATIVE DIABETIC RETINOPATHY OF RIGHT EYE WITHOUT MACULAR EDEMA ASSOCIATED WITH TYPE 2 DIABETES MELLITUS: ICD-10-CM

## 2023-02-23 DIAGNOSIS — H31.011 MACULAR SCAR, RIGHT: ICD-10-CM

## 2023-02-23 DIAGNOSIS — Z96.1 PSEUDOPHAKIA OF BOTH EYES: ICD-10-CM

## 2023-02-23 PROCEDURE — 67028 PR INJECT INTRAVITREAL PHARMCOLOGIC: ICD-10-PCS | Mod: LT,S$GLB,, | Performed by: OPHTHALMOLOGY

## 2023-02-23 PROCEDURE — 99214 OFFICE O/P EST MOD 30 MIN: CPT | Mod: 25,S$GLB,, | Performed by: OPHTHALMOLOGY

## 2023-02-23 PROCEDURE — 92134 OCT, RETINA - OU - BOTH EYES: ICD-10-PCS | Mod: S$GLB,,, | Performed by: OPHTHALMOLOGY

## 2023-02-23 PROCEDURE — 67028 INJECTION EYE DRUG: CPT | Mod: LT,S$GLB,, | Performed by: OPHTHALMOLOGY

## 2023-02-23 PROCEDURE — 99214 PR OFFICE/OUTPT VISIT, EST, LEVL IV, 30-39 MIN: ICD-10-PCS | Mod: 25,S$GLB,, | Performed by: OPHTHALMOLOGY

## 2023-02-23 PROCEDURE — 92134 CPTRZ OPH DX IMG PST SGM RTA: CPT | Mod: S$GLB,,, | Performed by: OPHTHALMOLOGY

## 2023-02-23 RX ADMIN — Medication 1.25 MG: at 10:02

## 2023-02-23 NOTE — PROGRESS NOTES
HPI    Pt is here today for 1 month DFE, OCT, SEGUNDO.  He states his left eye seems to be about the same since his last visit.    No current eye meds  Last edited by Victor Hugo Morris on 2/23/2023  9:59 AM.          A/P    ICD-10-CM ICD-9-CM   1. 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 Mild nonproliferative diabetic retinopathy of right eye without macular edema associated with type 2 diabetes mellitus  E11.3291 250.50     362.04   3. Macular scar, right  H31.011 363.32   4. Pseudophakia of both eyes  Z96.1 V43.1         1. 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 Mild nonproliferative diabetic retinopathy of right eye without macular edema associated with type 2 diabetes mellitus  F/u for DR check  PCP Charly Correia MD  No recent A1C     OD: mac scar, scant IRH in periphery  Plan: Observation    OS: s/p SEGUNDO 1/20/23  VA 20/60 (was 20/200), significant foveal IRF still, peripheral few MA, pt states not diabetic- needs PCP f/u  Plan: needs SEGUNDO OS for significant foveal IRF, given tapered effect of Avastin will get auth for Eylea if not improving   Based on todays exam, diagnostic studies, and review of records, the determination was made for treatment today.  Schedule Avastin Injection today Left Eye Patient chooses to proceed with injection R/B/A discussed include infection retinal detachment and stroke    Patient identified.  Timeout performed.    Risks, benefits, and alternatives to treatment were discussed in detail with the patient, including bleeding/infection (endophthalmitis)/etc.  The patient voiced understanding and wished to proceed with the procedure.  See separate consent form.    Injection Procedure Note:  Diagnosis: DME Left EyeLeft    Topical Proparacaine drop placed then topical 5% Betadine and then  subconjunctival lidocaine 2% injection  Sterile gloves used, and sterile lid speculum placed.  5% Betadine placed at injection site again prior to injection.  Avastin 1.25mg in 0.05cc Injected inferotemporally 3.5-4mm posterior to the limbus.  Complications: None  Va at least CF at 5 feet post injection.  Retina, ONH, IOP normal after injection.    Followup as below.  Patient should return immediately PRN.  Retinal Detachment and Endophthalmitis precautions given.     3. Macular scar, right  Long standing macular scar  Plan: Observation for now  Monocular precautions    4. Pseudophakia of both eyes  Good lens position OU  Plan: Observation, update Mrx       RTC 1 mo DFE/OCTm OU, get auth for Eylea OS      I saw and examined the patient and reviewed in detail the findings documented. The final examination findings, image interpretations, and plan as documented in the record represent my personal judgment and conclusions.    Dima Mayes MD  Vitreoretinal Surgery   [facility]

## 2023-03-23 ENCOUNTER — PROCEDURE VISIT (OUTPATIENT)
Dept: OPHTHALMOLOGY | Facility: CLINIC | Age: 78
End: 2023-03-23
Payer: MEDICARE

## 2023-03-23 DIAGNOSIS — H31.011 MACULAR SCAR, RIGHT: ICD-10-CM

## 2023-03-23 DIAGNOSIS — E11.3212 MILD NONPROLIFERATIVE DIABETIC RETINOPATHY OF LEFT EYE WITH MACULAR EDEMA ASSOCIATED WITH TYPE 2 DIABETES MELLITUS: Primary | ICD-10-CM

## 2023-03-23 DIAGNOSIS — E11.3291 MILD NONPROLIFERATIVE DIABETIC RETINOPATHY OF RIGHT EYE WITHOUT MACULAR EDEMA ASSOCIATED WITH TYPE 2 DIABETES MELLITUS: ICD-10-CM

## 2023-03-23 PROCEDURE — 67028 INJECTION EYE DRUG: CPT | Mod: LT,S$GLB,, | Performed by: OPHTHALMOLOGY

## 2023-03-23 PROCEDURE — 92134 OCT, RETINA - OU - BOTH EYES: ICD-10-PCS | Mod: S$GLB,,, | Performed by: OPHTHALMOLOGY

## 2023-03-23 PROCEDURE — 99214 PR OFFICE/OUTPT VISIT, EST, LEVL IV, 30-39 MIN: ICD-10-PCS | Mod: 25,S$GLB,, | Performed by: OPHTHALMOLOGY

## 2023-03-23 PROCEDURE — 67028 PR INJECT INTRAVITREAL PHARMCOLOGIC: ICD-10-PCS | Mod: LT,S$GLB,, | Performed by: OPHTHALMOLOGY

## 2023-03-23 PROCEDURE — 99214 OFFICE O/P EST MOD 30 MIN: CPT | Mod: 25,S$GLB,, | Performed by: OPHTHALMOLOGY

## 2023-03-23 PROCEDURE — 92134 CPTRZ OPH DX IMG PST SGM RTA: CPT | Mod: S$GLB,,, | Performed by: OPHTHALMOLOGY

## 2023-03-23 NOTE — PROGRESS NOTES
HPI    DLS: 2/23/23    No eyedrops  S/p Cataract sx OU     Pt her for 1 month DFE, OCT and Eylea OS. Pt states VA OS is blurry.  Pt   states occasional floaters OS.  Pt denies flashes or eye pain OU.   Last edited by Goldie San MA on 3/23/2023 10:19 AM.     HPI    DLS: 2/23/23    No eyedrops  S/p Cataract sx OU     Pt her for 1 month DFE, OCT and Eylea OS. Pt states VA OS is blurry.  Pt   states occasional floaters OS.  Pt denies flashes or eye pain OU.   Last edited by Goldie San MA on 3/23/2023 10:19 AM.         A/P    ICD-10-CM ICD-9-CM   1. 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 Mild nonproliferative diabetic retinopathy of right eye without macular edema associated with type 2 diabetes mellitus  E11.3291 250.50     362.04   3. Macular scar, right  H31.011 363.32         1. 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 Mild nonproliferative diabetic retinopathy of right eye without macular edema associated with type 2 diabetes mellitus  F/u for DR check  PCP Charly Correia MD  No recent A1C     OD: mac scar, stable, scant IRH in periphery  Plan: Observation    OS: s/p SEGUNDO x2 2/23/23   VA 20/40 (was 20/60), significant foveal IRF, peripheral few MA, pt states not diabetic- needs PCP f/u, has anemia and sees Heme/Onc  Plan: recommend switch to Eylea given persistent IRF and limited response with Avastin   Based on todays exam, diagnostic studies, and review of records, the determination was made for treatment today.  Schedule Eylea Injection Left Eye today Patient chooses to proceed with injection R/B/A discussed include infection retinal detachment and stroke    Patient identified.  Timeout performed.    Risks, benefits, and alternatives to treatment were discussed in detail with the patient,  including bleeding/infection (endophthalmitis)/etc.  The patient voiced understanding and wished to proceed with the procedure.  See separate consent form.    Injection Procedure Note:  Diagnosis: DME Left Eye    Topical Proparacaine drop placed then topical 5% Betadine, then subcojunctival lidocaine 2% injection  Sterile gloves used, and sterile lid speculum placed.  5% Betadine placed at injection site again prior to injection.  Eylea 2mg in 0.05cc Injected inferotemporally 3.5-4mm posterior to the limbus.  Complications: None  Va at least CF at 5 feet post injection.  Retina, ONH, IOP normal after injection.    Followup as below.  Patient should return immediately PRN.  Retinal Detachment and Endophthalmitis precautions given     3. Macular scar, right  Long standing macular scar  Plan: Observation for now  Monocular precautions    4. Pseudophakia of both eyes  Good lens position OU  Plan: Observation, update Mrx       RTC 1 mo DFE/OCTm OU, possible Eylea OS, if unchanged we can observe       I saw and examined the patient and reviewed in detail the findings documented. The final examination findings, image interpretations, and plan as documented in the record represent my personal judgment and conclusions.    Dima Mayes MD  Vitreoretinal Surgery   [facility]

## 2023-04-27 ENCOUNTER — OFFICE VISIT (OUTPATIENT)
Dept: OPHTHALMOLOGY | Facility: CLINIC | Age: 78
End: 2023-04-27
Payer: MEDICARE

## 2023-04-27 DIAGNOSIS — Z96.1 PSEUDOPHAKIA OF BOTH EYES: ICD-10-CM

## 2023-04-27 DIAGNOSIS — E11.3291 MILD NONPROLIFERATIVE DIABETIC RETINOPATHY OF RIGHT EYE WITHOUT MACULAR EDEMA ASSOCIATED WITH TYPE 2 DIABETES MELLITUS: ICD-10-CM

## 2023-04-27 DIAGNOSIS — E11.3212 MILD NONPROLIFERATIVE DIABETIC RETINOPATHY OF LEFT EYE WITH MACULAR EDEMA ASSOCIATED WITH TYPE 2 DIABETES MELLITUS: Primary | ICD-10-CM

## 2023-04-27 DIAGNOSIS — H31.011 MACULAR SCAR, RIGHT: ICD-10-CM

## 2023-04-27 PROCEDURE — 1159F MED LIST DOCD IN RCRD: CPT | Mod: CPTII,S$GLB,, | Performed by: OPHTHALMOLOGY

## 2023-04-27 PROCEDURE — 99999 PR PBB SHADOW E&M-EST. PATIENT-LVL II: ICD-10-PCS | Mod: PBBFAC,,, | Performed by: OPHTHALMOLOGY

## 2023-04-27 PROCEDURE — 1160F PR REVIEW ALL MEDS BY PRESCRIBER/CLIN PHARMACIST DOCUMENTED: ICD-10-PCS | Mod: CPTII,S$GLB,, | Performed by: OPHTHALMOLOGY

## 2023-04-27 PROCEDURE — 92134 OCT, RETINA - OU - BOTH EYES: ICD-10-PCS | Mod: S$GLB,,, | Performed by: OPHTHALMOLOGY

## 2023-04-27 PROCEDURE — 1160F RVW MEDS BY RX/DR IN RCRD: CPT | Mod: CPTII,S$GLB,, | Performed by: OPHTHALMOLOGY

## 2023-04-27 PROCEDURE — 1159F PR MEDICATION LIST DOCUMENTED IN MEDICAL RECORD: ICD-10-PCS | Mod: CPTII,S$GLB,, | Performed by: OPHTHALMOLOGY

## 2023-04-27 PROCEDURE — 99214 OFFICE O/P EST MOD 30 MIN: CPT | Mod: S$GLB,,, | Performed by: OPHTHALMOLOGY

## 2023-04-27 PROCEDURE — 99214 PR OFFICE/OUTPT VISIT, EST, LEVL IV, 30-39 MIN: ICD-10-PCS | Mod: S$GLB,,, | Performed by: OPHTHALMOLOGY

## 2023-04-27 PROCEDURE — 92134 CPTRZ OPH DX IMG PST SGM RTA: CPT | Mod: S$GLB,,, | Performed by: OPHTHALMOLOGY

## 2023-04-27 PROCEDURE — 99999 PR PBB SHADOW E&M-EST. PATIENT-LVL II: CPT | Mod: PBBFAC,,, | Performed by: OPHTHALMOLOGY

## 2023-04-27 NOTE — PROGRESS NOTES
HPI    DLS: 01/20/2023  1 month DFE/OCT Eylea OS  NPDR OS  PC IOL OU   Last edited by Rimma Gonzalez MA on 4/27/2023  8:52 AM.           A/P    ICD-10-CM ICD-9-CM   1. 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 Mild nonproliferative diabetic retinopathy of right eye without macular edema associated with type 2 diabetes mellitus  E11.3291 250.50     362.04   3. Macular scar, right  H31.011 363.32   4. Pseudophakia of both eyes  Z96.1 V43.1          1. 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 Mild nonproliferative diabetic retinopathy of right eye without macular edema associated with type 2 diabetes mellitus  F/u for DR check  PCP Charly Correia MD  No recent A1C     OD: mac scar, scant IRH in periphery  Plan: Observation    OS: s/p SEGUNDO x2 2/23/23   S/p KING 3/23/23  VA 20/50 (was 20/40), significant foveal IRF still despite Eylea and Avastin unchanged, pt does not feel injections helping    Plan: observe for now, will recheck in 4-6 weeks to see if worsening, limited impact of injections thus far on chronic IRF, consider Eylea next visit if worse     3. Macular scar, right  Long standing macular scar  Plan: Observation for now  Monocular precautions    4. Pseudophakia of both eyes  Good lens position OU  Plan: Observation, update Mrx   Monocular precautions, polycarb lenses needed       RTC 1 mo DFE/OCTm OU, possible Eylea OS, if unchanged we can observe       I saw and examined the patient and reviewed in detail the findings documented. The final examination findings, image interpretations, and plan as documented in the record represent my personal judgment and conclusions.    Dima Mayes MD  Vitreoretinal Surgery   [facility]

## 2023-06-01 ENCOUNTER — OFFICE VISIT (OUTPATIENT)
Dept: OPHTHALMOLOGY | Facility: CLINIC | Age: 78
End: 2023-06-01
Payer: MEDICARE

## 2023-06-01 DIAGNOSIS — E11.3212 MILD NONPROLIFERATIVE DIABETIC RETINOPATHY OF LEFT EYE WITH MACULAR EDEMA ASSOCIATED WITH TYPE 2 DIABETES MELLITUS: Primary | ICD-10-CM

## 2023-06-01 DIAGNOSIS — Z96.1 PSEUDOPHAKIA OF BOTH EYES: ICD-10-CM

## 2023-06-01 DIAGNOSIS — E11.3291 MILD NONPROLIFERATIVE DIABETIC RETINOPATHY OF RIGHT EYE WITHOUT MACULAR EDEMA ASSOCIATED WITH TYPE 2 DIABETES MELLITUS: ICD-10-CM

## 2023-06-01 DIAGNOSIS — H31.011 MACULAR SCAR, RIGHT: ICD-10-CM

## 2023-06-01 PROCEDURE — 99214 OFFICE O/P EST MOD 30 MIN: CPT | Mod: S$GLB,,, | Performed by: OPHTHALMOLOGY

## 2023-06-01 PROCEDURE — 92134 CPTRZ OPH DX IMG PST SGM RTA: CPT | Mod: S$GLB,,, | Performed by: OPHTHALMOLOGY

## 2023-06-01 PROCEDURE — 1126F AMNT PAIN NOTED NONE PRSNT: CPT | Mod: CPTII,S$GLB,, | Performed by: OPHTHALMOLOGY

## 2023-06-01 PROCEDURE — 3288F PR FALLS RISK ASSESSMENT DOCUMENTED: ICD-10-PCS | Mod: CPTII,S$GLB,, | Performed by: OPHTHALMOLOGY

## 2023-06-01 PROCEDURE — 1101F PT FALLS ASSESS-DOCD LE1/YR: CPT | Mod: CPTII,S$GLB,, | Performed by: OPHTHALMOLOGY

## 2023-06-01 PROCEDURE — 1126F PR PAIN SEVERITY QUANTIFIED, NO PAIN PRESENT: ICD-10-PCS | Mod: CPTII,S$GLB,, | Performed by: OPHTHALMOLOGY

## 2023-06-01 PROCEDURE — 1159F MED LIST DOCD IN RCRD: CPT | Mod: CPTII,S$GLB,, | Performed by: OPHTHALMOLOGY

## 2023-06-01 PROCEDURE — 92134 OCT, RETINA - OU - BOTH EYES: ICD-10-PCS | Mod: S$GLB,,, | Performed by: OPHTHALMOLOGY

## 2023-06-01 PROCEDURE — 1160F RVW MEDS BY RX/DR IN RCRD: CPT | Mod: CPTII,S$GLB,, | Performed by: OPHTHALMOLOGY

## 2023-06-01 PROCEDURE — 99999 PR PBB SHADOW E&M-EST. PATIENT-LVL II: ICD-10-PCS | Mod: PBBFAC,,, | Performed by: OPHTHALMOLOGY

## 2023-06-01 PROCEDURE — 3288F FALL RISK ASSESSMENT DOCD: CPT | Mod: CPTII,S$GLB,, | Performed by: OPHTHALMOLOGY

## 2023-06-01 PROCEDURE — 99214 PR OFFICE/OUTPT VISIT, EST, LEVL IV, 30-39 MIN: ICD-10-PCS | Mod: S$GLB,,, | Performed by: OPHTHALMOLOGY

## 2023-06-01 PROCEDURE — 1159F PR MEDICATION LIST DOCUMENTED IN MEDICAL RECORD: ICD-10-PCS | Mod: CPTII,S$GLB,, | Performed by: OPHTHALMOLOGY

## 2023-06-01 PROCEDURE — 1101F PR PT FALLS ASSESS DOC 0-1 FALLS W/OUT INJ PAST YR: ICD-10-PCS | Mod: CPTII,S$GLB,, | Performed by: OPHTHALMOLOGY

## 2023-06-01 PROCEDURE — 1160F PR REVIEW ALL MEDS BY PRESCRIBER/CLIN PHARMACIST DOCUMENTED: ICD-10-PCS | Mod: CPTII,S$GLB,, | Performed by: OPHTHALMOLOGY

## 2023-06-01 PROCEDURE — 99999 PR PBB SHADOW E&M-EST. PATIENT-LVL II: CPT | Mod: PBBFAC,,, | Performed by: OPHTHALMOLOGY

## 2023-06-01 NOTE — PROGRESS NOTES
HPI    1 month DFE/OCT and Eylea OS  DLS: 4/27/2023    Pt states that there has been no changes in vision. Pt denies eye pain. Pt   states that eyes occasionally water.     GTTS: none    Last edited by Rachel Galaviz MA on 6/1/2023 10:32 AM.          A/P    ICD-10-CM ICD-9-CM   1. 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 Mild nonproliferative diabetic retinopathy of right eye without macular edema associated with type 2 diabetes mellitus  E11.3291 250.50     362.04   3. Macular scar, right  H31.011 363.32   4. Pseudophakia of both eyes  Z96.1 V43.1          1. 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 Mild nonproliferative diabetic retinopathy of right eye without macular edema associated with type 2 diabetes mellitus  F/u for DR check  PCP Charly Correia MD  No recent A1C     OD: central scarring, scant IRH in periphery  Plan: Observation    OS: s/p SEGUNDO x2 2/23/23   S/p KING 3/23/23  VA 20/50 (stable), significant foveal IRF centarl cyst still despite Eylea and Avastin unchanged, pt does not feel injections helped    Plan: observe for now, will recheck in 3-4mo to see if worsening, limited impact of injections thus far on chronic IRF, consider Eylea next visit if worse     3. Macular scar, right  Long standing macular scar  Plan: Observation for now  Monocular precautions    4. Pseudophakia of both eyes  Good lens position OU  Plan: Observation, update Mrx   Monocular precautions, polycarb lenses needed       RTC 3-4 mo DFE/OCTm OU, possible Eylea OS       I saw and examined the patient and reviewed in detail the findings documented. The final examination findings, image interpretations, and plan as documented in the record represent my personal judgment and conclusions.    Dima Mayes MD  Vitreoretinal Surgery   [facility]

## 2023-08-16 ENCOUNTER — OFFICE VISIT (OUTPATIENT)
Dept: OPTOMETRY | Facility: CLINIC | Age: 78
End: 2023-08-16
Payer: MEDICARE

## 2023-08-16 DIAGNOSIS — H53.10 SUBJECTIVE VISUAL DISTURBANCE: Primary | ICD-10-CM

## 2023-08-16 PROCEDURE — 99999 PR PBB SHADOW E&M-EST. PATIENT-LVL I: CPT | Mod: PBBFAC,,, | Performed by: OPTOMETRIST

## 2023-08-16 PROCEDURE — 99499 NO LOS: ICD-10-PCS | Mod: S$GLB,,, | Performed by: OPTOMETRIST

## 2023-08-16 PROCEDURE — 99999 PR PBB SHADOW E&M-EST. PATIENT-LVL I: ICD-10-PCS | Mod: PBBFAC,,, | Performed by: OPTOMETRIST

## 2023-08-16 PROCEDURE — 99499 UNLISTED E&M SERVICE: CPT | Mod: S$GLB,,, | Performed by: OPTOMETRIST

## 2023-08-16 NOTE — PROGRESS NOTES
PT RETURN TO CLINIC FOR GLASSES RX. REPORTS DOING STABLE.     Assessment /Plan     For exam results, see Encounter Report.    Subjective visual disturbance      MONITOR. ED PT ON ALL EXAM FINDINGS  RELEASE RX SPECS

## 2023-09-01 ENCOUNTER — OFFICE VISIT (OUTPATIENT)
Dept: OPHTHALMOLOGY | Facility: CLINIC | Age: 78
End: 2023-09-01
Payer: MEDICARE

## 2023-09-01 DIAGNOSIS — E11.3291 MILD NONPROLIFERATIVE DIABETIC RETINOPATHY OF RIGHT EYE WITHOUT MACULAR EDEMA ASSOCIATED WITH TYPE 2 DIABETES MELLITUS: ICD-10-CM

## 2023-09-01 DIAGNOSIS — E11.3212 MILD NONPROLIFERATIVE DIABETIC RETINOPATHY OF LEFT EYE WITH MACULAR EDEMA ASSOCIATED WITH TYPE 2 DIABETES MELLITUS: Primary | ICD-10-CM

## 2023-09-01 DIAGNOSIS — H31.011 MACULAR SCAR, RIGHT: ICD-10-CM

## 2023-09-01 DIAGNOSIS — Z96.1 PSEUDOPHAKIA OF BOTH EYES: ICD-10-CM

## 2023-09-01 PROCEDURE — 1126F AMNT PAIN NOTED NONE PRSNT: CPT | Mod: CPTII,S$GLB,, | Performed by: OPHTHALMOLOGY

## 2023-09-01 PROCEDURE — 99999 PR PBB SHADOW E&M-EST. PATIENT-LVL II: CPT | Mod: PBBFAC,,, | Performed by: OPHTHALMOLOGY

## 2023-09-01 PROCEDURE — 92134 CPTRZ OPH DX IMG PST SGM RTA: CPT | Mod: S$GLB,,, | Performed by: OPHTHALMOLOGY

## 2023-09-01 PROCEDURE — 1160F PR REVIEW ALL MEDS BY PRESCRIBER/CLIN PHARMACIST DOCUMENTED: ICD-10-PCS | Mod: CPTII,S$GLB,, | Performed by: OPHTHALMOLOGY

## 2023-09-01 PROCEDURE — 1159F PR MEDICATION LIST DOCUMENTED IN MEDICAL RECORD: ICD-10-PCS | Mod: CPTII,S$GLB,, | Performed by: OPHTHALMOLOGY

## 2023-09-01 PROCEDURE — 1126F PR PAIN SEVERITY QUANTIFIED, NO PAIN PRESENT: ICD-10-PCS | Mod: CPTII,S$GLB,, | Performed by: OPHTHALMOLOGY

## 2023-09-01 PROCEDURE — 92014 PR EYE EXAM, EST PATIENT,COMPREHESV: ICD-10-PCS | Mod: S$GLB,,, | Performed by: OPHTHALMOLOGY

## 2023-09-01 PROCEDURE — 1101F PR PT FALLS ASSESS DOC 0-1 FALLS W/OUT INJ PAST YR: ICD-10-PCS | Mod: CPTII,S$GLB,, | Performed by: OPHTHALMOLOGY

## 2023-09-01 PROCEDURE — 99999 PR PBB SHADOW E&M-EST. PATIENT-LVL II: ICD-10-PCS | Mod: PBBFAC,,, | Performed by: OPHTHALMOLOGY

## 2023-09-01 PROCEDURE — 3288F PR FALLS RISK ASSESSMENT DOCUMENTED: ICD-10-PCS | Mod: CPTII,S$GLB,, | Performed by: OPHTHALMOLOGY

## 2023-09-01 PROCEDURE — 1101F PT FALLS ASSESS-DOCD LE1/YR: CPT | Mod: CPTII,S$GLB,, | Performed by: OPHTHALMOLOGY

## 2023-09-01 PROCEDURE — 1159F MED LIST DOCD IN RCRD: CPT | Mod: CPTII,S$GLB,, | Performed by: OPHTHALMOLOGY

## 2023-09-01 PROCEDURE — 92014 COMPRE OPH EXAM EST PT 1/>: CPT | Mod: S$GLB,,, | Performed by: OPHTHALMOLOGY

## 2023-09-01 PROCEDURE — 1160F RVW MEDS BY RX/DR IN RCRD: CPT | Mod: CPTII,S$GLB,, | Performed by: OPHTHALMOLOGY

## 2023-09-01 PROCEDURE — 92134 OCT, RETINA - OU - BOTH EYES: ICD-10-PCS | Mod: S$GLB,,, | Performed by: OPHTHALMOLOGY

## 2023-09-01 PROCEDURE — 3288F FALL RISK ASSESSMENT DOCD: CPT | Mod: CPTII,S$GLB,, | Performed by: OPHTHALMOLOGY

## 2023-09-01 NOTE — PROGRESS NOTES
HPI      POHx:   NPDR OS   02/23/23-SEGUNDO X2  03/23/2023 OVE 03/23/2023    2. Macular Scar OD  3. Pseudophakia OU  PC IOL OU        Last edited by Sandra Wilburn MA on 9/1/2023  9:43 AM.           A/P    ICD-10-CM ICD-9-CM   1. 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 Mild nonproliferative diabetic retinopathy of right eye without macular edema associated with type 2 diabetes mellitus  E11.3291 250.50     362.04   3. Macular scar, right  H31.011 363.32   4. Pseudophakia of both eyes  Z96.1 V43.1       1. 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 Mild nonproliferative diabetic retinopathy of right eye without macular edema associated with type 2 diabetes mellitus  F/u for DR check  Charly Israel MD  No recent A1C     OD: central scarring, mild IRH  Plan: Observation    OS: s/p SEGUNDO x2 2/23/23   S/p KING 3/23/23  VA 20/25 after new Mrx (was 20/50), stable foveal IRF centarl cyst still despite Eylea and Avastin unchanged, pt does not feel injections helped    Plan: observe for now,monitor IRF for injection if worsening     3. Macular scar, right  Long standing macular scar  Plan: Observation for now  Monocular precautions    4. Pseudophakia of both eyes  Good lens position OU  Plan: Observation        RTC 6 mo DFE/OCTm OU, possible Eylea OS       I saw and examined the patient and reviewed in detail the findings documented. The final examination findings, image interpretations, and plan as documented in the record represent my personal judgment and conclusions.    Dima Mayes MD  Vitreoretinal Surgery   [facility]